# Patient Record
Sex: FEMALE | Race: OTHER | HISPANIC OR LATINO
[De-identification: names, ages, dates, MRNs, and addresses within clinical notes are randomized per-mention and may not be internally consistent; named-entity substitution may affect disease eponyms.]

---

## 2017-03-16 ENCOUNTER — RX RENEWAL (OUTPATIENT)
Age: 47
End: 2017-03-16

## 2017-05-12 ENCOUNTER — FORM ENCOUNTER (OUTPATIENT)
Age: 47
End: 2017-05-12

## 2017-05-13 ENCOUNTER — APPOINTMENT (OUTPATIENT)
Dept: MAMMOGRAPHY | Facility: IMAGING CENTER | Age: 47
End: 2017-05-13

## 2017-05-13 ENCOUNTER — OUTPATIENT (OUTPATIENT)
Dept: OUTPATIENT SERVICES | Facility: HOSPITAL | Age: 47
LOS: 1 days | End: 2017-05-13
Payer: MEDICAID

## 2017-05-13 DIAGNOSIS — Z00.00 ENCOUNTER FOR GENERAL ADULT MEDICAL EXAMINATION WITHOUT ABNORMAL FINDINGS: ICD-10-CM

## 2017-05-13 PROCEDURE — 77067 SCR MAMMO BI INCL CAD: CPT

## 2017-05-13 PROCEDURE — 77063 BREAST TOMOSYNTHESIS BI: CPT

## 2019-01-17 ENCOUNTER — OUTPATIENT (OUTPATIENT)
Dept: OUTPATIENT SERVICES | Facility: HOSPITAL | Age: 49
LOS: 1 days | End: 2019-01-17
Payer: COMMERCIAL

## 2019-01-17 ENCOUNTER — APPOINTMENT (OUTPATIENT)
Dept: INTERNAL MEDICINE | Facility: CLINIC | Age: 49
End: 2019-01-17

## 2019-01-17 VITALS
HEART RATE: 73 BPM | BODY MASS INDEX: 23.99 KG/M2 | SYSTOLIC BLOOD PRESSURE: 112 MMHG | HEIGHT: 59 IN | OXYGEN SATURATION: 98 % | DIASTOLIC BLOOD PRESSURE: 66 MMHG | WEIGHT: 119 LBS

## 2019-01-17 VITALS — TEMPERATURE: 98.2 F

## 2019-01-17 DIAGNOSIS — R05 COUGH: ICD-10-CM

## 2019-01-17 DIAGNOSIS — R73.03 PREDIABETES: ICD-10-CM

## 2019-01-17 DIAGNOSIS — J30.9 ALLERGIC RHINITIS, UNSPECIFIED: ICD-10-CM

## 2019-01-17 DIAGNOSIS — I10 ESSENTIAL (PRIMARY) HYPERTENSION: ICD-10-CM

## 2019-01-17 PROCEDURE — G0463: CPT

## 2019-01-25 NOTE — END OF VISIT
[] : Resident [FreeTextEntry3] : 46F PMH chronic allergies, prediabetes (5.9%), HLD, c/o dry cough for 6 weeks after URI. Likely post-viral cough 2/2 PND. Will treat for this and plan for follow up to ensure resolution. Would consider GERD as well. Patient with no smoking history or weight loss. Would try PPI if treatment for PND not successful before considering imaging.

## 2019-01-25 NOTE — PHYSICAL EXAM
[No Acute Distress] : no acute distress [Well Nourished] : well nourished [Well Developed] : well developed [Well-Appearing] : well-appearing [Normal Sclera/Conjunctiva] : normal sclera/conjunctiva [PERRL] : pupils equal round and reactive to light [EOMI] : extraocular movements intact [Normal Outer Ear/Nose] : the outer ears and nose were normal in appearance [No JVD] : no jugular venous distention [Supple] : supple [No Lymphadenopathy] : no lymphadenopathy [No Respiratory Distress] : no respiratory distress  [Clear to Auscultation] : lungs were clear to auscultation bilaterally [No Accessory Muscle Use] : no accessory muscle use [Normal Rate] : normal rate  [Regular Rhythm] : with a regular rhythm [Normal S1, S2] : normal S1 and S2 [No Murmur] : no murmur heard [Pedal Pulses Present] : the pedal pulses are present [No Edema] : there was no peripheral edema [No Extremity Clubbing/Cyanosis] : no extremity clubbing/cyanosis [Soft] : abdomen soft [Non Tender] : non-tender [Non-distended] : non-distended [No Masses] : no abdominal mass palpated [No HSM] : no HSM [Normal Bowel Sounds] : normal bowel sounds [Normal Supraclavicular Nodes] : no supraclavicular lymphadenopathy [Normal Posterior Cervical Nodes] : no posterior cervical lymphadenopathy [Normal Anterior Cervical Nodes] : no anterior cervical lymphadenopathy [No CVA Tenderness] : no CVA  tenderness [No Spinal Tenderness] : no spinal tenderness [No Joint Swelling] : no joint swelling [Grossly Normal Strength/Tone] : grossly normal strength/tone [No Rash] : no rash [Normal Gait] : normal gait [Coordination Grossly Intact] : coordination grossly intact [No Focal Deficits] : no focal deficits [Deep Tendon Reflexes (DTR)] : deep tendon reflexes were 2+ and symmetric [Normal Affect] : the affect was normal [Normal Insight/Judgement] : insight and judgment were intact [de-identified] : no sinus tenderness; erythmatous turbinates

## 2019-01-25 NOTE — ASSESSMENT
[FreeTextEntry1] : 46F PMH chronic allergies, prediabetes (5.9%), HLD, presenting for CPE.\par \par chronic cough - Post viral cough vs PND vs GERD vs malignancy. No LAD and with clear lung exam. \par - start guaifenesin, fluticasone, loratadine, montelukast\par -f/u in 5 wks, if no imporvement will need CT chst\par - f/u cbc, cmp \par allergies - plan as above\par \par \par HCM \par Routine labs - lipids, a1c, tsh\par Pap - 2014 nml, due 2019\par Mammo referral given today \par flu shot deferred\par \par f/u n 5 wks for chronic cough\par d/w Dr Michelle

## 2019-01-25 NOTE — HISTORY OF PRESENT ILLNESS
[de-identified] : 48 F PMH chronic allergies, prediabetes (5.9%), HLD, presenting for CPE.\par \par cough - Reports cough for 6 wks after a URI. Previously with fever, cough, rhinorrhea, chest congestion. Now reports continued noproductive cough with associated chest congestion. Has tried taking theraflu, zyrtec, and claritin. Last subjective fever apprx 1 wk ago. \par \par allergies - reports chronic allergies with rhinorrhea, red eyes, and post nasal drip. Ran out of previous meds of montelukast and fluticasone.\par \par HCM \par Pap - 2014 nml\par Mammo 2017 birads 1

## 2019-01-25 NOTE — HEALTH RISK ASSESSMENT
[Good] : ~his/her~  mood as  good [No falls in past year] : Patient reported no falls in the past year [0] : 2) Feeling down, depressed, or hopeless: Not at all (0) [Patient reported mammogram was normal] : Patient reported mammogram was normal [Patient reported PAP Smear was normal] : Patient reported PAP Smear was normal [Change in mental status noted] : Change in mental status noted [With Family] : lives with family [Employed] : employed [] :  [Feels Safe at Home] : Feels safe at home [] : No [Reports changes in hearing] : Reports no changes in hearing [Reports changes in vision] : Reports no changes in vision [Reports changes in dental health] : Reports no changes in dental health [MammogramDate] : 2017 [PapSmearDate] : 2014 [FreeTextEntry2] : [employer]

## 2019-02-20 ENCOUNTER — FORM ENCOUNTER (OUTPATIENT)
Age: 49
End: 2019-02-20

## 2019-02-21 ENCOUNTER — APPOINTMENT (OUTPATIENT)
Dept: MAMMOGRAPHY | Facility: IMAGING CENTER | Age: 49
End: 2019-02-21
Payer: COMMERCIAL

## 2019-02-21 ENCOUNTER — OUTPATIENT (OUTPATIENT)
Dept: OUTPATIENT SERVICES | Facility: HOSPITAL | Age: 49
LOS: 1 days | End: 2019-02-21
Payer: COMMERCIAL

## 2019-02-21 DIAGNOSIS — R05 COUGH: ICD-10-CM

## 2019-02-21 PROCEDURE — 77063 BREAST TOMOSYNTHESIS BI: CPT | Mod: 26

## 2019-02-21 PROCEDURE — 77063 BREAST TOMOSYNTHESIS BI: CPT

## 2019-02-21 PROCEDURE — 77067 SCR MAMMO BI INCL CAD: CPT

## 2019-02-21 PROCEDURE — 77067 SCR MAMMO BI INCL CAD: CPT | Mod: 26

## 2019-03-05 ENCOUNTER — APPOINTMENT (OUTPATIENT)
Dept: OPHTHALMOLOGY | Facility: CLINIC | Age: 49
End: 2019-03-05

## 2019-03-05 ENCOUNTER — EMERGENCY (EMERGENCY)
Facility: HOSPITAL | Age: 49
LOS: 1 days | Discharge: HOME | End: 2019-03-05
Attending: EMERGENCY MEDICINE
Payer: COMMERCIAL

## 2019-03-05 VITALS
TEMPERATURE: 98 F | OXYGEN SATURATION: 98 % | SYSTOLIC BLOOD PRESSURE: 115 MMHG | HEART RATE: 77 BPM | RESPIRATION RATE: 18 BRPM | HEIGHT: 62 IN | WEIGHT: 115.96 LBS | DIASTOLIC BLOOD PRESSURE: 70 MMHG

## 2019-03-05 VITALS
TEMPERATURE: 98 F | RESPIRATION RATE: 16 BRPM | DIASTOLIC BLOOD PRESSURE: 74 MMHG | OXYGEN SATURATION: 100 % | SYSTOLIC BLOOD PRESSURE: 96 MMHG | HEART RATE: 64 BPM

## 2019-03-05 LAB
ANION GAP SERPL CALC-SCNC: 12 MMOL/L — SIGNIFICANT CHANGE UP (ref 5–17)
BASOPHILS # BLD AUTO: 0 K/UL — SIGNIFICANT CHANGE UP (ref 0–0.2)
BASOPHILS NFR BLD AUTO: 0.4 % — SIGNIFICANT CHANGE UP (ref 0–2)
BUN SERPL-MCNC: 13 MG/DL — SIGNIFICANT CHANGE UP (ref 7–23)
CALCIUM SERPL-MCNC: 9.6 MG/DL — SIGNIFICANT CHANGE UP (ref 8.4–10.5)
CHLORIDE SERPL-SCNC: 104 MMOL/L — SIGNIFICANT CHANGE UP (ref 96–108)
CO2 SERPL-SCNC: 25 MMOL/L — SIGNIFICANT CHANGE UP (ref 22–31)
CREAT SERPL-MCNC: 0.67 MG/DL — SIGNIFICANT CHANGE UP (ref 0.5–1.3)
EOSINOPHIL # BLD AUTO: 0.2 K/UL — SIGNIFICANT CHANGE UP (ref 0–0.5)
EOSINOPHIL NFR BLD AUTO: 3 % — SIGNIFICANT CHANGE UP (ref 0–6)
GLUCOSE SERPL-MCNC: 85 MG/DL — SIGNIFICANT CHANGE UP (ref 70–99)
HCT VFR BLD CALC: 36.6 % — SIGNIFICANT CHANGE UP (ref 34.5–45)
HGB BLD-MCNC: 13 G/DL — SIGNIFICANT CHANGE UP (ref 11.5–15.5)
LYMPHOCYTES # BLD AUTO: 2.9 K/UL — SIGNIFICANT CHANGE UP (ref 1–3.3)
LYMPHOCYTES # BLD AUTO: 49.5 % — HIGH (ref 13–44)
MCHC RBC-ENTMCNC: 30.4 PG — SIGNIFICANT CHANGE UP (ref 27–34)
MCHC RBC-ENTMCNC: 35.6 GM/DL — SIGNIFICANT CHANGE UP (ref 32–36)
MCV RBC AUTO: 85.4 FL — SIGNIFICANT CHANGE UP (ref 80–100)
MONOCYTES # BLD AUTO: 0.4 K/UL — SIGNIFICANT CHANGE UP (ref 0–0.9)
MONOCYTES NFR BLD AUTO: 6.7 % — SIGNIFICANT CHANGE UP (ref 2–14)
NEUTROPHILS # BLD AUTO: 2.4 K/UL — SIGNIFICANT CHANGE UP (ref 1.8–7.4)
NEUTROPHILS NFR BLD AUTO: 40.4 % — LOW (ref 43–77)
PLATELET # BLD AUTO: 177 K/UL — SIGNIFICANT CHANGE UP (ref 150–400)
POTASSIUM SERPL-MCNC: 3.9 MMOL/L — SIGNIFICANT CHANGE UP (ref 3.5–5.3)
POTASSIUM SERPL-SCNC: 3.9 MMOL/L — SIGNIFICANT CHANGE UP (ref 3.5–5.3)
RBC # BLD: 4.28 M/UL — SIGNIFICANT CHANGE UP (ref 3.8–5.2)
RBC # FLD: 11.9 % — SIGNIFICANT CHANGE UP (ref 10.3–14.5)
SODIUM SERPL-SCNC: 141 MMOL/L — SIGNIFICANT CHANGE UP (ref 135–145)
WBC # BLD: 5.9 K/UL — SIGNIFICANT CHANGE UP (ref 3.8–10.5)
WBC # FLD AUTO: 5.9 K/UL — SIGNIFICANT CHANGE UP (ref 3.8–10.5)

## 2019-03-05 PROCEDURE — 70487 CT MAXILLOFACIAL W/DYE: CPT

## 2019-03-05 PROCEDURE — 85027 COMPLETE CBC AUTOMATED: CPT

## 2019-03-05 PROCEDURE — 99284 EMERGENCY DEPT VISIT MOD MDM: CPT

## 2019-03-05 PROCEDURE — 80048 BASIC METABOLIC PNL TOTAL CA: CPT

## 2019-03-05 PROCEDURE — 70487 CT MAXILLOFACIAL W/DYE: CPT | Mod: 26

## 2019-03-05 NOTE — ED PROVIDER NOTE - ATTENDING CONTRIBUTION TO CARE
Attending MD Cohen: I personally have seen and examined this patient.  Resident note reviewed and agree on plan of care and except where noted.  See below for details.     Seen in FT9    49F with no reported PMH/PSH/Meds/NKDA presents to the ED with R eye pain.  Reports that on Thursday 2/28/19 developed stabbing like pain of the right eye, reports since then has developed swelling.  Reports has had two previous episodes, last episode was two months ago.  Reports last seen by ophthalmologist two years ago, reports wears glasses to read but did not bring them.  Denies fever, denies trauma.  Reports pain with eye movement.  Reports blurry vision.  Reports cramping pain in the L eye as well.  Reports has seen discharge. Attending MD Cohen: I personally have seen and examined this patient.  Resident note reviewed and agree on plan of care and except where noted.  See below for details.     Seen in FT9    49F with no reported PMH/PSH/Meds/NKDA presents to the ED with R eye pain.  Reports that on Thursday 2/28/19 developed stabbing like pain of the right eye, reports since then has developed swelling.  Reports has had two previous episodes, last episode was two months ago.  Reports last seen by ophthalmologist two years ago, reports wears glasses but did not bring them.  Denies contact lens use.  Denies fever, denies trauma.  Reports pain with eye movement.  Reports blurry vision.  Reports cramping pain in the L eye as well.  Reports has seen discharge.  On exam, NAD, head NCAT, PERRL, EOMI, confrontational VF full, Va sc OD 20/70+1, OS 20/70+2, OU 20/70+2, no periorbital ecchymosis, no tenderness to palpation of orbital rim, lid margins clear, +R upper lid edema, no retained foreign body on lid eversion, no bulbar conjunctival injection, +1 tarsal conjunctival injection OD, no corneal defect, AC no cells or flare, fundus exam limited, disc margins sharp and flat, FROM at neck, no tenderness to midline palpation, no stepoffs along length of spine, lungs CTAB with good inspiratory effort, +S1S2, no m/r/g, moving all extremities, ambulating with steady gait; A/P: 49F with R eye pain, Ddx low suspicion for orbital cellulitis but given patient multiple complaints of pain with eye movement, discharge, and blurry vision will obtain CT to rule out, other DDx includes R upper lid edema, ?hordeolum/chalazion in infancy, contact dermatitis, will reassess

## 2019-03-05 NOTE — ED PROVIDER NOTE - OBJECTIVE STATEMENT
Marlene Pérez M.D: 49F no pmh p/w 2 months of intermittent right eye cramping pain and swelling. also c/o pain with EOM, blurry vision, and minimal purulent discharge from eyes. no fevers/chills. has not seen a doctor for this yet. normally wears glasses but does not have them with her today. has not taken anything for pain

## 2019-03-05 NOTE — ED ADULT NURSE NOTE - OBJECTIVE STATEMENT
49 y.o. Female presents to the ED accompanied by  for R eye swelling and pain. Hx . Citizen of [place] speaking with Dr. Cohen at bedside to translate. As per pt, she felt a stabbing pain on R eye on Thursday and developed swelling. Pt reports having an episode 2 months ago. Pt wears glasses. Denies trauma, fever. +blurry vision and pain of R eye - cramping. No redness noted on eye. A&Ox3. Ambulatory. Well-appearing. Pt is in no current distress. Comfort and safety provided. Will continue to monitor. Dr. Cohen at bedside for assessment.

## 2019-03-05 NOTE — ED ADULT NURSE NOTE - CHPI ED NUR SYMPTOMS NEG
no bleeding gums/no chills/no fever/no nausea/no numbness/no loss of consciousness/no syncope/no vomiting/no weakness

## 2019-03-05 NOTE — ED PROVIDER NOTE - NSFOLLOWUPCLINICS_GEN_ALL_ED_FT
[facility] - Ophthalmology  Ophthalmology  [address], [address]  [place] [address]  Phone: [phone]  Fax:   Follow Up Time: 1-3 Days

## 2019-03-05 NOTE — ED PROVIDER NOTE - NSFOLLOWUPINSTRUCTIONS_ED_ALL_ED_FT
- You were seen in the Emergency Department today.  You had a CT scan which showed your had preseptal cellulitis.     - Your case discussed was discussed by the ED Attending Dr. Cohen with the Ophthalmology Resident Dr. Gomes.  You are to go to the Ophthalmology Clinic today as they are expecting you today.  Dr. Gomes was to call to make the clinic aware of your impending arrival.  - Once you are evaluated at the clinic, please verify that they want you to take the prescribed AUGMENTIN or if they would prefer a different medication.  AUGMENTIN has already been sent to the pharmacy.    - Return to the ED for new or worsening symptoms.

## 2019-03-05 NOTE — ED PROVIDER NOTE - PHYSICAL EXAMINATION
Marlene Pérez M.D.:   patient awake alert seen lying on stretcher NAD .   LUNGS CTAB no wheeze no crackle.   CARD RRR no m/r/g.    Abdomen soft NT ND no rebound no guarding no CVA tenderness.   EXT WWP no edema no calf tenderness CV 2+DP/PT bilaterally.   neuro A&Ox3 gait normal.    skin warm and dry no rash  HEENT: moist mucous membranes, PERRL, EOMI no conjunctival injection or drainage to speak of. no apparent distress with EOMI (despite HPI). mild swelling to upper lid without overlying cellulitic changes.  full ophtho exam performed and documented in attending attestation.

## 2019-03-05 NOTE — ED PROVIDER NOTE - PROGRESS NOTE DETAILS
Attending MD Cohen: Spoke with Dr. Gomes of ophthalmology, will Rx Augmentin and send to Ophtho clinic from here.  Stable for discharge. Follow up instructions given, importance of follow up emphasized, return to ED parameters reviewed and patient verbalized understanding.  All questions answered, all concerns addressed.

## 2019-03-08 ENCOUNTER — APPOINTMENT (OUTPATIENT)
Dept: OPHTHALMOLOGY | Facility: CLINIC | Age: 49
End: 2019-03-08

## 2019-03-14 ENCOUNTER — OUTPATIENT (OUTPATIENT)
Dept: OUTPATIENT SERVICES | Facility: HOSPITAL | Age: 49
LOS: 1 days | End: 2019-03-14
Payer: COMMERCIAL

## 2019-03-14 ENCOUNTER — APPOINTMENT (OUTPATIENT)
Dept: INTERNAL MEDICINE | Facility: CLINIC | Age: 49
End: 2019-03-14
Payer: COMMERCIAL

## 2019-03-14 VITALS
DIASTOLIC BLOOD PRESSURE: 70 MMHG | HEIGHT: 59 IN | SYSTOLIC BLOOD PRESSURE: 110 MMHG | WEIGHT: 114 LBS | BODY MASS INDEX: 22.98 KG/M2

## 2019-03-14 DIAGNOSIS — I10 ESSENTIAL (PRIMARY) HYPERTENSION: ICD-10-CM

## 2019-03-14 DIAGNOSIS — L03.213 PERIORBITAL CELLULITIS: ICD-10-CM

## 2019-03-14 PROCEDURE — G0463: CPT

## 2019-03-14 PROCEDURE — 99213 OFFICE O/P EST LOW 20 MIN: CPT | Mod: GE

## 2019-03-20 NOTE — HISTORY OF PRESENT ILLNESS
[Spouse] : spouse [FreeTextEntry1] : follow up after ED visit [de-identified] : 49F w/ PMH prediabetes and chronic allergies presents after ED visit for right eye pain found to have preseptal cellulitis. Patient developed right eye redness, eyelid swelling, and eye pain about 2 weeks ago. Patient went to [facility] ED on 3/5 and was evaluated with CT, which showed right preseptal cellulitis. She was prescribed Augmentin and sent directly to ophtho clinic from the ED. Patient was evaluated at ophtho clinic that day and seen for f/u one week ago and cleared with no further intervention necessary.\par \par Today, patient states eye pain has resolved. She has been taking Augmentin daily and has 3 more tablets to complete her course. She denies any redness, pain on eye movement, eye discharge, vision changes. She denies fevers, chills, headache.\par \par Patient today c/o insomnia for the past 20+ years. She states she gets 1-2 hours of sleep maximum each night. She has been going to bed at 9pm and watches TV until 11pm. Only caffeine intake is 1 cup coffee in the AM. She has tried melatonin, Nyquil, and various other OTC medications for sleep (names of which she does not recall), and none have been effective. Patient states that she has always been the type to think a lot and worry since a teenager but does not think that stress or racing thoughts are keeping her awake at night. Patient denies depressed mood, anhedonia, SI. Patient was offered referral to behavioral health but she refused, stating that she is very Scientologist and prefers to pray to God. Patient and  deny pt snoring or waking up SOB.\par \par \par

## 2019-03-20 NOTE — PHYSICAL EXAM
[No Acute Distress] : no acute distress [Well Nourished] : well nourished [Well-Appearing] : well-appearing [Normal Sclera/Conjunctiva] : normal sclera/conjunctiva [PERRL] : pupils equal round and reactive to light [EOMI] : extraocular movements intact [Normal Outer Ear/Nose] : the outer ears and nose were normal in appearance [Normal Oropharynx] : the oropharynx was normal [Supple] : supple [No Lymphadenopathy] : no lymphadenopathy [No Respiratory Distress] : no respiratory distress  [Clear to Auscultation] : lungs were clear to auscultation bilaterally [No Accessory Muscle Use] : no accessory muscle use [Normal Rate] : normal rate  [Regular Rhythm] : with a regular rhythm [Normal S1, S2] : normal S1 and S2 [Soft] : abdomen soft [Non Tender] : non-tender [Non-distended] : non-distended [Normal Bowel Sounds] : normal bowel sounds [Normal Gait] : normal gait [Normal Affect] : the affect was normal [Normal Mood] : the mood was normal [de-identified] : +R maxillary tenderness

## 2019-03-20 NOTE — ASSESSMENT
[FreeTextEntry1] : 49F w/ PMH prediabetes and chronic allergies presents after ED visit for right eye pain found to have preseptal cellulitis.\par \par #preseptal cellulitis\par -Patient w/ R preseptal cellulitis currently taking Augmentin. Symptoms of pain and swelling much improved. Patient evaluated at ophtho clinic 2x since onset of sx, and pt states no f/u required.\par -Patient advised to finish treatment of Augmentin and to call if symptoms worsen.\par \par #insomnia\par -Patient with chronic insomnia, possibly due to poor sleep hygiene vs. general anxiety.\par -Patient advised on sleep hygiene: no screen use 2 hours prior to bed. No reading or watching screens in bed. Make room cool, dark, and quiet when going to bed, with use of eye mask if necessary.\par -Patient declined referral to Behavioral Health for possible iCBT. Patient states she will think about it.\par -Patient given referral for home sleep study.\par \par #HCM\par -results of lab results from January 2019 reviewed with patient.\par -patient advised to reduce consumption of white rice, noodles, and bread as well as to increase exercise.\par -Return to [facility] in January 2020 for CPE or call if any acute concerns. \par \par \par Case discussed with Dr. Michelle.\par \par

## 2019-03-20 NOTE — REVIEW OF SYSTEMS
[Insomnia] : insomnia [Fever] : no fever [Chills] : no chills [Discharge] : no discharge [Pain] : no pain [Redness] : no redness [Vision Problems] : no vision problems [Earache] : no earache [Chest Pain] : no chest pain [Paroysmal Nocturnal Dyspnea] : no paroysmal nocturnal dyspnea [Shortness Of Breath] : no shortness of breath [Cough] : no cough [Abdominal Pain] : no abdominal pain [Headache] : no headache [Vomiting] : no vomiting [Diarrhea] : diarrhea [Depression] : no depression [Suicidal] : not suicidal

## 2019-03-21 DIAGNOSIS — L03.213 PERIORBITAL CELLULITIS: ICD-10-CM

## 2019-03-21 DIAGNOSIS — G47.00 INSOMNIA, UNSPECIFIED: ICD-10-CM

## 2019-06-14 ENCOUNTER — MESSAGE (OUTPATIENT)
Age: 49
End: 2019-06-14

## 2020-02-06 ENCOUNTER — LABORATORY RESULT (OUTPATIENT)
Age: 50
End: 2020-02-06

## 2020-02-06 ENCOUNTER — APPOINTMENT (OUTPATIENT)
Dept: INTERNAL MEDICINE | Facility: CLINIC | Age: 50
End: 2020-02-06
Payer: MEDICAID

## 2020-02-06 ENCOUNTER — OUTPATIENT (OUTPATIENT)
Dept: OUTPATIENT SERVICES | Facility: HOSPITAL | Age: 50
LOS: 1 days | End: 2020-02-06
Payer: MEDICAID

## 2020-02-06 VITALS
BODY MASS INDEX: 20.8 KG/M2 | SYSTOLIC BLOOD PRESSURE: 100 MMHG | HEART RATE: 73 BPM | OXYGEN SATURATION: 99 % | WEIGHT: 103 LBS | DIASTOLIC BLOOD PRESSURE: 60 MMHG

## 2020-02-06 DIAGNOSIS — Z12.4 ENCOUNTER FOR SCREENING FOR MALIGNANT NEOPLASM OF CERVIX: ICD-10-CM

## 2020-02-06 DIAGNOSIS — I10 ESSENTIAL (PRIMARY) HYPERTENSION: ICD-10-CM

## 2020-02-06 DIAGNOSIS — R73.03 PREDIABETES.: ICD-10-CM

## 2020-02-06 LAB
HCT VFR BLD CALC: 36.9 % — SIGNIFICANT CHANGE UP (ref 34.5–45)
HGB BLD-MCNC: 11.9 G/DL — SIGNIFICANT CHANGE UP (ref 11.5–15.5)
MCHC RBC-ENTMCNC: 28.4 PG — SIGNIFICANT CHANGE UP (ref 27–34)
MCHC RBC-ENTMCNC: 32.2 GM/DL — SIGNIFICANT CHANGE UP (ref 32–36)
MCV RBC AUTO: 88.1 FL — SIGNIFICANT CHANGE UP (ref 80–100)
PLATELET # BLD AUTO: 230 K/UL — SIGNIFICANT CHANGE UP (ref 150–400)
RBC # BLD: 4.19 M/UL — SIGNIFICANT CHANGE UP (ref 3.8–5.2)
RBC # FLD: 13.1 % — SIGNIFICANT CHANGE UP (ref 10.3–14.5)
WBC # BLD: 7.73 K/UL — SIGNIFICANT CHANGE UP (ref 3.8–10.5)
WBC # FLD AUTO: 7.73 K/UL — SIGNIFICANT CHANGE UP (ref 3.8–10.5)

## 2020-02-06 PROCEDURE — 99214 OFFICE O/P EST MOD 30 MIN: CPT | Mod: GC

## 2020-02-06 PROCEDURE — 80053 COMPREHEN METABOLIC PANEL: CPT

## 2020-02-06 PROCEDURE — 80061 LIPID PANEL: CPT

## 2020-02-06 PROCEDURE — 85027 COMPLETE CBC AUTOMATED: CPT

## 2020-02-06 PROCEDURE — 83036 HEMOGLOBIN GLYCOSYLATED A1C: CPT

## 2020-02-06 PROCEDURE — G0463: CPT

## 2020-02-06 RX ORDER — GUAIFENESIN 400 MG/1
400 TABLET ORAL EVERY 4 HOURS
Qty: 42 | Refills: 0 | Status: DISCONTINUED | COMMUNITY
Start: 2019-01-17 | End: 2020-02-06

## 2020-02-06 RX ORDER — SODIUM BICARBONATE, SODIUM CHLORIDE 700; 2300 MG/3G; MG/3G
2300-700 POWDER, FOR SOLUTION NASAL
Qty: 1 | Refills: 0 | Status: DISCONTINUED | COMMUNITY
Start: 2019-01-17 | End: 2020-02-06

## 2020-02-06 NOTE — PHYSICAL EXAM
[No Acute Distress] : no acute distress [Well Nourished] : well nourished [Well Developed] : well developed [Normal Sclera/Conjunctiva] : normal sclera/conjunctiva [Well-Appearing] : well-appearing [Normal Outer Ear/Nose] : the outer ears and nose were normal in appearance [EOMI] : extraocular movements intact [PERRL] : pupils equal round and reactive to light [No JVD] : no jugular venous distention [Normal Oropharynx] : the oropharynx was normal [No Lymphadenopathy] : no lymphadenopathy [Supple] : supple [No Respiratory Distress] : no respiratory distress  [No Accessory Muscle Use] : no accessory muscle use [Clear to Auscultation] : lungs were clear to auscultation bilaterally [Normal Rate] : normal rate  [Regular Rhythm] : with a regular rhythm [Normal S1, S2] : normal S1 and S2 [No Murmur] : no murmur heard [No Varicosities] : no varicosities [Pedal Pulses Present] : the pedal pulses are present [No Edema] : there was no peripheral edema [No Extremity Clubbing/Cyanosis] : no extremity clubbing/cyanosis [Soft] : abdomen soft [Non Tender] : non-tender [Non-distended] : non-distended [No Masses] : no abdominal mass palpated [No HSM] : no HSM [Normal Posterior Cervical Nodes] : no posterior cervical lymphadenopathy [Normal Bowel Sounds] : normal bowel sounds [Normal Supraclavicular Nodes] : no supraclavicular lymphadenopathy [Normal Anterior Cervical Nodes] : no anterior cervical lymphadenopathy [Grossly Normal Strength/Tone] : grossly normal strength/tone [No Joint Swelling] : no joint swelling [No Rash] : no rash [Coordination Grossly Intact] : coordination grossly intact [No Focal Deficits] : no focal deficits [Normal Affect] : the affect was normal [Normal Insight/Judgement] : insight and judgment were intact

## 2020-02-07 ENCOUNTER — RX RENEWAL (OUTPATIENT)
Age: 50
End: 2020-02-07

## 2020-02-07 LAB
ALBUMIN SERPL ELPH-MCNC: 4.3 G/DL — SIGNIFICANT CHANGE UP (ref 3.3–5)
ALP SERPL-CCNC: 87 U/L — SIGNIFICANT CHANGE UP (ref 40–120)
ALT FLD-CCNC: 16 U/L — SIGNIFICANT CHANGE UP (ref 10–45)
ANION GAP SERPL CALC-SCNC: 12 MMOL/L — SIGNIFICANT CHANGE UP (ref 5–17)
AST SERPL-CCNC: 16 U/L — SIGNIFICANT CHANGE UP (ref 10–40)
BILIRUB SERPL-MCNC: <0.2 MG/DL — SIGNIFICANT CHANGE UP (ref 0.2–1.2)
BUN SERPL-MCNC: 18 MG/DL — SIGNIFICANT CHANGE UP (ref 7–23)
CALCIUM SERPL-MCNC: 9.2 MG/DL — SIGNIFICANT CHANGE UP (ref 8.4–10.5)
CHLORIDE SERPL-SCNC: 104 MMOL/L — SIGNIFICANT CHANGE UP (ref 96–108)
CHOLEST SERPL-MCNC: 184 MG/DL — SIGNIFICANT CHANGE UP (ref 10–199)
CO2 SERPL-SCNC: 25 MMOL/L — SIGNIFICANT CHANGE UP (ref 22–31)
CREAT SERPL-MCNC: 0.79 MG/DL — SIGNIFICANT CHANGE UP (ref 0.5–1.3)
ESTIMATED AVERAGE GLUCOSE: 117 MG/DL — HIGH (ref 68–114)
GLUCOSE SERPL-MCNC: 94 MG/DL — SIGNIFICANT CHANGE UP (ref 70–99)
HBA1C BLD-MCNC: 5.7 % — HIGH (ref 4–5.6)
HDLC SERPL-MCNC: 60 MG/DL — SIGNIFICANT CHANGE UP
LIPID PNL WITH DIRECT LDL SERPL: 108 MG/DL — HIGH
POTASSIUM SERPL-MCNC: 4 MMOL/L — SIGNIFICANT CHANGE UP (ref 3.5–5.3)
POTASSIUM SERPL-SCNC: 4 MMOL/L — SIGNIFICANT CHANGE UP (ref 3.5–5.3)
PROT SERPL-MCNC: 6.4 G/DL — SIGNIFICANT CHANGE UP (ref 6–8.3)
SODIUM SERPL-SCNC: 141 MMOL/L — SIGNIFICANT CHANGE UP (ref 135–145)
TOTAL CHOLESTEROL/HDL RATIO MEASUREMENT: 3.1 RATIO — LOW (ref 3.3–7.1)
TRIGL SERPL-MCNC: 79 MG/DL — SIGNIFICANT CHANGE UP (ref 10–149)

## 2020-02-20 DIAGNOSIS — Z12.4 ENCOUNTER FOR SCREENING FOR MALIGNANT NEOPLASM OF CERVIX: ICD-10-CM

## 2020-02-20 DIAGNOSIS — K21.9 GASTRO-ESOPHAGEAL REFLUX DISEASE WITHOUT ESOPHAGITIS: ICD-10-CM

## 2020-02-20 DIAGNOSIS — G47.00 INSOMNIA, UNSPECIFIED: ICD-10-CM

## 2020-02-20 DIAGNOSIS — R73.03 PREDIABETES: ICD-10-CM

## 2020-02-20 NOTE — REVIEW OF SYSTEMS
[Heartburn] : heartburn [Fever] : no fever [Chills] : no chills [Night Sweats] : no night sweats [Shortness Of Breath] : no shortness of breath [Palpitations] : no palpitations [Chest Pain] : no chest pain [Orthopnea] : no orthopnea [Cough] : no cough [Wheezing] : no wheezing [Nausea] : no nausea [Dyspnea on Exertion] : no dyspnea on exertion [Abdominal Pain] : no abdominal pain [Diarrhea] : diarrhea [Vomiting] : no vomiting [Melena] : no melena [Dysuria] : no dysuria [Hematuria] : no hematuria

## 2020-02-20 NOTE — HISTORY OF PRESENT ILLNESS
[FreeTextEntry1] : reflux  [de-identified] : 49F w/ PMH prediabetes and chronic allergies presents due to acid reflux\par \par reflux - endorses feeling acid reflux from stomach upto the throat and now with feeling like something is stuck in throat, feels it occur it everyday. Has no trouble swallowing or drinking. has tried OTC meds without improvement, tums and peptobismal. Endorses feeling weight loss since last year. Denies n/v/diarrhea. Not associated with any particular foods. Has attempted to cutdown on acidic foods without improvement.\par \par insomnia - has hx of insomnia but now feels she is sleeping less due to health concerns. Wakes up multiple times at night. Awakens with all noises and does not feel rested in AM. denies depression and anxiety.Denies screen use prior to bed. Goes to bed everynight at same time. PHQ 2 score of 0.\par \par HCM\par wants Gyn referral

## 2020-02-20 NOTE — ASSESSMENT
[FreeTextEntry1] : 49F w/ PMH prediabetes and chronic allergies presents due to acid reflux\par \par Throat sensation 2/2 GERD - start omeprazole 40mg qd. ddx includes Post Nasal Drip and anxiety\par - GI referral due to weight loss\par - cont allergy medications\par - consider SSRI for anxiety if no improvement and if gastritis is ruled out by GI\par \par insomnia - \par - sleep hygiene information given\par - sleep specialist referral given\par - can try zaleplon at next visit if no improvement\par \par prediabetes- \par - f/u cbc/cmp/tsh, A1c, lipids\par \par HCM \par -gyn referral given for cervical ca screening\par \par f/u in 2 months \par d/w Dr Bear

## 2020-03-23 ENCOUNTER — APPOINTMENT (OUTPATIENT)
Dept: OBGYN | Facility: CLINIC | Age: 50
End: 2020-03-23

## 2020-04-16 ENCOUNTER — APPOINTMENT (OUTPATIENT)
Dept: INTERNAL MEDICINE | Facility: CLINIC | Age: 50
End: 2020-04-16

## 2020-08-04 ENCOUNTER — APPOINTMENT (OUTPATIENT)
Dept: GASTROENTEROLOGY | Facility: HOSPITAL | Age: 50
End: 2020-08-04

## 2021-01-19 ENCOUNTER — OUTPATIENT (OUTPATIENT)
Dept: OUTPATIENT SERVICES | Facility: HOSPITAL | Age: 51
LOS: 1 days | End: 2021-01-19
Payer: MEDICAID

## 2021-01-19 ENCOUNTER — MED ADMIN CHARGE (OUTPATIENT)
Age: 51
End: 2021-01-19

## 2021-01-19 ENCOUNTER — LABORATORY RESULT (OUTPATIENT)
Age: 51
End: 2021-01-19

## 2021-01-19 ENCOUNTER — APPOINTMENT (OUTPATIENT)
Dept: INTERNAL MEDICINE | Facility: CLINIC | Age: 51
End: 2021-01-19
Payer: MEDICAID

## 2021-01-19 VITALS
HEART RATE: 82 BPM | DIASTOLIC BLOOD PRESSURE: 70 MMHG | WEIGHT: 105 LBS | SYSTOLIC BLOOD PRESSURE: 110 MMHG | OXYGEN SATURATION: 99 % | BODY MASS INDEX: 21.21 KG/M2

## 2021-01-19 DIAGNOSIS — I10 ESSENTIAL (PRIMARY) HYPERTENSION: ICD-10-CM

## 2021-01-19 DIAGNOSIS — Z23 ENCOUNTER FOR IMMUNIZATION: ICD-10-CM

## 2021-01-19 PROCEDURE — G0463: CPT

## 2021-01-19 PROCEDURE — 99396 PREV VISIT EST AGE 40-64: CPT | Mod: GC

## 2021-01-19 NOTE — PLAN
[FreeTextEntry1] : #GERD:\par - pt amenable to getting tested for H. pylori. 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 Salinas\par Rosas Cabrera, PGY-1

## 2021-01-19 NOTE — HISTORY OF PRESENT ILLNESS
[FreeTextEntry1] : cpe [de-identified] : 50y F PMHx GERD, chronic allergies p/w cpe. Accompanied by her daughter. Pt does not have any acute medical complaints but notes that she has allergies during all 4 seasons. She works as a  and is constantly exposed to dust and other particles in the home. She also notes her hands get very dry with the cleaning products. She takes omeprazole, loratadine, fluticasone, and montelukast and these help her symptoms. She has never been tested for H. Pylori. \par \par Of note, pt notes she went through menopause at 47. Pt also notes she got her flu shot in November.

## 2021-01-21 DIAGNOSIS — Z00.00 ENCOUNTER FOR GENERAL ADULT MEDICAL EXAMINATION WITHOUT ABNORMAL FINDINGS: ICD-10-CM

## 2021-01-22 ENCOUNTER — NON-APPOINTMENT (OUTPATIENT)
Age: 51
End: 2021-01-22

## 2021-01-22 LAB
BASOPHILS # BLD AUTO: 0.04 K/UL
BASOPHILS NFR BLD AUTO: 0.7 %
EOSINOPHIL # BLD AUTO: 0.21 K/UL
EOSINOPHIL NFR BLD AUTO: 3.7 %
ESTIMATED AVERAGE GLUCOSE: 114 MG/DL
HBA1C MFR BLD HPLC: 5.6 %
HCT VFR BLD CALC: 40 %
HCV AB SER QL: NONREACTIVE
HCV S/CO RATIO: 0.27 S/CO
HGB BLD-MCNC: 12.5 G/DL
IMM GRANULOCYTES NFR BLD AUTO: 0.2 %
LYMPHOCYTES # BLD AUTO: 2.72 K/UL
LYMPHOCYTES NFR BLD AUTO: 47.5 %
MAN DIFF?: NORMAL
MCHC RBC-ENTMCNC: 28.5 PG
MCHC RBC-ENTMCNC: 31.3 GM/DL
MCV RBC AUTO: 91.1 FL
MONOCYTES # BLD AUTO: 0.46 K/UL
MONOCYTES NFR BLD AUTO: 8 %
NEUTROPHILS # BLD AUTO: 2.29 K/UL
NEUTROPHILS NFR BLD AUTO: 39.9 %
PLATELET # BLD AUTO: 221 K/UL
RBC # BLD: 4.39 M/UL
RBC # FLD: 13.2 %
WBC # FLD AUTO: 5.73 K/UL

## 2021-02-03 ENCOUNTER — OUTPATIENT (OUTPATIENT)
Dept: OUTPATIENT SERVICES | Facility: HOSPITAL | Age: 51
LOS: 1 days | End: 2021-02-03
Payer: MEDICAID

## 2021-02-03 ENCOUNTER — LABORATORY RESULT (OUTPATIENT)
Age: 51
End: 2021-02-03

## 2021-02-03 ENCOUNTER — APPOINTMENT (OUTPATIENT)
Dept: OBGYN | Facility: CLINIC | Age: 51
End: 2021-02-03
Payer: MEDICAID

## 2021-02-03 VITALS — TEMPERATURE: 97.5 F

## 2021-02-03 VITALS — BODY MASS INDEX: 22.02 KG/M2 | SYSTOLIC BLOOD PRESSURE: 102 MMHG | DIASTOLIC BLOOD PRESSURE: 60 MMHG | WEIGHT: 109 LBS

## 2021-02-03 DIAGNOSIS — N76.0 ACUTE VAGINITIS: ICD-10-CM

## 2021-02-03 DIAGNOSIS — Z01.419 ENCOUNTER FOR GYNECOLOGICAL EXAMINATION (GENERAL) (ROUTINE) WITHOUT ABNORMAL FINDINGS: ICD-10-CM

## 2021-02-03 DIAGNOSIS — Z01.419 ENCOUNTER FOR GYNECOLOGICAL EXAMINATION (GENERAL) (ROUTINE) W/OUT ABNORMAL FINDINGS: ICD-10-CM

## 2021-02-03 PROCEDURE — 87624 HPV HI-RISK TYP POOLED RSLT: CPT

## 2021-02-03 PROCEDURE — 87491 CHLMYD TRACH DNA AMP PROBE: CPT

## 2021-02-03 PROCEDURE — 87591 N.GONORRHOEAE DNA AMP PROB: CPT

## 2021-02-03 PROCEDURE — 88175 CYTOPATH C/V AUTO FLUID REDO: CPT

## 2021-02-03 NOTE — PHYSICAL EXAM
[Appropriately responsive] : appropriately responsive [Alert] : alert [No Acute Distress] : no acute distress [No Lymphadenopathy] : no lymphadenopathy [Regular Rate Rhythm] : regular rate rhythm [No Murmurs] : no murmurs [Clear to Auscultation B/L] : clear to auscultation bilaterally [Soft] : soft [Non-tender] : non-tender [Non-distended] : non-distended [No HSM] : No HSM [No Lesions] : no lesions [No Mass] : no mass [Oriented x3] : oriented x3 [Examination Of The Breasts] : a normal appearance [No Masses] : no breast masses were palpable [Labia Majora] : normal [Labia Minora] : normal [Atrophy] : atrophy [Normal] : normal [Normal Position] : in a normal position [Tenderness] : nontender [Mass ___ cm] : no uterine mass was palpated [Uterine Adnexae] : normal

## 2021-02-03 NOTE — HISTORY OF PRESENT ILLNESS
[FreeTextEntry1] : 49yo  (LMP 46yo ) presents to reestablish care-  pt has not been seen since 2014.  Denies PMB or pain.  Denies vasomotor sx.  / monogamous-  denies vaginal dryness.  Declined STD screen\par Denies change in bowl / bladder habits or breast complaint. \par \par - PAP 2014 NEG\par - Mammo 2019 Birads1   - [ ]has appt next month\par - Colonoscopy- never-  [ ] has GI appt 2021\par \par Gen health followed by medicine (gerd/allergies)\par  [Currently Active] : currently active [Men] : men [Vaginal] : vaginal [No] : No

## 2021-02-03 NOTE — DISCUSSION/SUMMARY
[FreeTextEntry1] : 49yo P2 - post menopausal annual gyn exam - no complaints\par - [ ]pap/ hpv collected\par - has mammo appt next month\par - has Gi referral, appt 6/2021\par - gen health followed by medicine\par - discussed need for all PMB to be evaluated if to occur.   \par - ca/ vit d supplementation\par \par RTC annual/prn\par Funmilayo Alonso, PAC

## 2021-02-04 LAB
C TRACH RRNA SPEC QL NAA+PROBE: SIGNIFICANT CHANGE UP
C TRACH+GC RRNA SPEC QL PROBE: SIGNIFICANT CHANGE UP
HPV HIGH+LOW RISK DNA PNL CVX: SIGNIFICANT CHANGE UP
N GONORRHOEA RRNA SPEC QL NAA+PROBE: SIGNIFICANT CHANGE UP

## 2021-02-06 LAB — CYTOLOGY SPEC DOC CYTO: SIGNIFICANT CHANGE UP

## 2021-02-12 ENCOUNTER — LABORATORY RESULT (OUTPATIENT)
Age: 51
End: 2021-02-12

## 2021-02-18 RX ORDER — PANTOPRAZOLE 40 MG/1
40 TABLET, DELAYED RELEASE ORAL TWICE DAILY
Qty: 28 | Refills: 0 | Status: COMPLETED | COMMUNITY
Start: 2021-02-18 | End: 2021-03-04

## 2021-02-18 RX ORDER — AMOXICILLIN 500 MG/1
500 TABLET, FILM COATED ORAL
Qty: 56 | Refills: 0 | Status: COMPLETED | COMMUNITY
Start: 2021-02-18 | End: 2021-03-04

## 2021-02-18 RX ORDER — CLARITHROMYCIN 500 MG/1
500 TABLET, FILM COATED ORAL
Qty: 28 | Refills: 0 | Status: COMPLETED | COMMUNITY
Start: 2021-02-18 | End: 2021-03-04

## 2021-03-03 ENCOUNTER — APPOINTMENT (OUTPATIENT)
Dept: MAMMOGRAPHY | Facility: IMAGING CENTER | Age: 51
End: 2021-03-03
Payer: MEDICAID

## 2021-03-03 ENCOUNTER — RESULT REVIEW (OUTPATIENT)
Age: 51
End: 2021-03-03

## 2021-03-03 ENCOUNTER — OUTPATIENT (OUTPATIENT)
Dept: OUTPATIENT SERVICES | Facility: HOSPITAL | Age: 51
LOS: 1 days | End: 2021-03-03
Payer: MEDICAID

## 2021-03-03 DIAGNOSIS — Z00.8 ENCOUNTER FOR OTHER GENERAL EXAMINATION: ICD-10-CM

## 2021-03-03 PROCEDURE — 77067 SCR MAMMO BI INCL CAD: CPT

## 2021-03-03 PROCEDURE — 77063 BREAST TOMOSYNTHESIS BI: CPT

## 2021-03-03 PROCEDURE — 77063 BREAST TOMOSYNTHESIS BI: CPT | Mod: 26

## 2021-03-03 PROCEDURE — 77067 SCR MAMMO BI INCL CAD: CPT | Mod: 26

## 2021-05-03 ENCOUNTER — TRANSCRIPTION ENCOUNTER (OUTPATIENT)
Age: 51
End: 2021-05-03

## 2021-05-23 ENCOUNTER — TRANSCRIPTION ENCOUNTER (OUTPATIENT)
Age: 51
End: 2021-05-23

## 2021-06-15 ENCOUNTER — APPOINTMENT (OUTPATIENT)
Dept: GASTROENTEROLOGY | Facility: HOSPITAL | Age: 51
End: 2021-06-15
Payer: MEDICAID

## 2021-06-15 ENCOUNTER — OUTPATIENT (OUTPATIENT)
Dept: OUTPATIENT SERVICES | Facility: HOSPITAL | Age: 51
LOS: 1 days | End: 2021-06-15
Payer: MEDICAID

## 2021-06-15 VITALS
DIASTOLIC BLOOD PRESSURE: 71 MMHG | BODY MASS INDEX: 22.88 KG/M2 | HEART RATE: 67 BPM | HEIGHT: 58.5 IN | WEIGHT: 112 LBS | TEMPERATURE: 96 F | SYSTOLIC BLOOD PRESSURE: 109 MMHG

## 2021-06-15 DIAGNOSIS — Z12.11 ENCOUNTER FOR SCREENING FOR MALIGNANT NEOPLASM OF COLON: ICD-10-CM

## 2021-06-15 DIAGNOSIS — K21.9 GASTRO-ESOPHAGEAL REFLUX DISEASE W/OUT ESOPHAGITIS: ICD-10-CM

## 2021-06-15 DIAGNOSIS — R10.9 UNSPECIFIED ABDOMINAL PAIN: ICD-10-CM

## 2021-06-15 DIAGNOSIS — Z80.0 FAMILY HISTORY OF MALIGNANT NEOPLASM OF DIGESTIVE ORGANS: ICD-10-CM

## 2021-06-15 DIAGNOSIS — A04.8 OTHER SPECIFIED BACTERIAL INTESTINAL INFECTIONS: ICD-10-CM

## 2021-06-15 DIAGNOSIS — K21.9 GASTRO-ESOPHAGEAL REFLUX DISEASE WITHOUT ESOPHAGITIS: ICD-10-CM

## 2021-06-15 PROCEDURE — G0463: CPT

## 2021-06-15 PROCEDURE — 99203 OFFICE O/P NEW LOW 30 MIN: CPT | Mod: GC

## 2021-06-15 NOTE — PHYSICAL EXAM
[General Appearance - Alert] : alert [General Appearance - In No Acute Distress] : in no acute distress [Sclera] : the sclera and conjunctiva were normal [PERRL With Normal Accommodation] : pupils were equal in size, round, and reactive to light [Examination Of The Oral Cavity] : the lips and gums were normal [Oropharynx] : the oropharynx was normal [Neck Appearance] : the appearance of the neck was normal [Apical Impulse] : the apical impulse was normal [Exaggerated Use Of Accessory Muscles For Inspiration] : no accessory muscle use [Heart Rate And Rhythm] : heart rate was normal and rhythm regular [Bowel Sounds] : normal bowel sounds [Abdomen Soft] : soft [Abdomen Tenderness] : non-tender [Abnormal Walk] : normal gait [Musculoskeletal - Swelling] : no joint swelling seen [] : no rash [Skin Lesions] : no skin lesions [Motor Exam] : the motor exam was normal [No Focal Deficits] : no focal deficits [Oriented To Time, Place, And Person] : oriented to person, place, and time [Affect] : the affect was normal

## 2021-06-15 NOTE — REVIEW OF SYSTEMS
[Cough] : cough [Heartburn] : heartburn [Fever] : no fever [Chills] : no chills [Eye Pain] : no eye pain [Red Eyes] : eyes not red [Chest Pain] : no chest pain [Palpitations] : no palpitations [Shortness Of Breath] : no shortness of breath [Wheezing] : no wheezing [Abdominal Pain] : no abdominal pain [Vomiting] : no vomiting [Constipation] : no constipation [Diarrhea] : no diarrhea

## 2021-06-15 NOTE — END OF VISIT
[] : Fellow [FreeTextEntry3] : As modified and discussed with patient\par MD MAHAD Huerta [facility]\par Associate Professor of Medicine\par [facility] [employer]\par

## 2021-06-15 NOTE — HISTORY OF PRESENT ILLNESS
[de-identified] : N/A [de-identified] : N/A [de-identified] : N/A [de-identified] : 50 y/o F w/ hx of HLD and H. pylori infection referred to GI clinic for persistent acid reflux.\par \par The patient reports persistent acid reflux for the past 4 years. She has tried OTC medications, which were ineffective, but she doesn't recall the names of the medications. She otherwise denies difficulty swallowing, painful swallowing, nausea/vomiting, weight loss, bloody stools, black tarry stools, and changes in her bowel habits. She has never had an EGD. She reports a colonoscopy many many years ago.\par \par The patient was found to have H. pylori infection and was prescribed clarithromycin triple therapy by IM clinic.

## 2021-06-15 NOTE — ASSESSMENT
[FreeTextEntry1] : Impression:\par \par # Acid reflux: Patient only previously took OTC with no relief. No risk factors for BE, however, family history of gastric cancer. \par # H.pylori infection: Positive stool Ag and treated with Clarithromycin based triple therapy which has high resistance.\par # Colon cancer screening: No prior screening.\par \par Plan:\par - EGD/Colon; patient was informed of risk of procedure including bleeding, infection, perforation, missed lesions, and anesthesia.\par - Miralax and bisacodyl for bowel prep\par - Famotidine 20 mg PO BID PRN\par - RTC after procedures\par \par

## 2021-07-29 ENCOUNTER — RESULT REVIEW (OUTPATIENT)
Age: 51
End: 2021-07-29

## 2021-07-29 ENCOUNTER — OUTPATIENT (OUTPATIENT)
Dept: OUTPATIENT SERVICES | Facility: HOSPITAL | Age: 51
LOS: 1 days | End: 2021-07-29
Payer: MEDICAID

## 2021-07-29 VITALS
HEIGHT: 65 IN | WEIGHT: 111.99 LBS | TEMPERATURE: 98 F | RESPIRATION RATE: 14 BRPM | OXYGEN SATURATION: 99 % | SYSTOLIC BLOOD PRESSURE: 111 MMHG | HEART RATE: 77 BPM | DIASTOLIC BLOOD PRESSURE: 69 MMHG

## 2021-07-29 VITALS
RESPIRATION RATE: 15 BRPM | OXYGEN SATURATION: 99 % | HEART RATE: 73 BPM | SYSTOLIC BLOOD PRESSURE: 109 MMHG | DIASTOLIC BLOOD PRESSURE: 73 MMHG

## 2021-07-29 DIAGNOSIS — Z98.891 HISTORY OF UTERINE SCAR FROM PREVIOUS SURGERY: Chronic | ICD-10-CM

## 2021-07-29 DIAGNOSIS — Z12.11 ENCOUNTER FOR SCREENING FOR MALIGNANT NEOPLASM OF COLON: ICD-10-CM

## 2021-07-29 PROCEDURE — 43239 EGD BIOPSY SINGLE/MULTIPLE: CPT

## 2021-07-29 PROCEDURE — G0121: CPT

## 2021-07-29 PROCEDURE — 88305 TISSUE EXAM BY PATHOLOGIST: CPT | Mod: 26

## 2021-07-29 PROCEDURE — 45378 DIAGNOSTIC COLONOSCOPY: CPT

## 2021-07-29 PROCEDURE — 88305 TISSUE EXAM BY PATHOLOGIST: CPT

## 2021-07-29 RX ORDER — SODIUM CHLORIDE 9 MG/ML
500 INJECTION, SOLUTION INTRAVENOUS
Refills: 0 | Status: DISCONTINUED | OUTPATIENT
Start: 2021-07-29 | End: 2021-08-12

## 2021-07-29 RX ORDER — LORATADINE 10 MG/1
1 TABLET ORAL
Qty: 0 | Refills: 0 | DISCHARGE

## 2021-07-29 NOTE — ASU PREOP CHECKLIST - WAS PATIENT ON BETA BLOCKER?
[facility]    Hospitalist Progress Note  Name: Matt Way    MRN: [id number]  Provider:  Jenelle Hardin PA-C  Date of Service: 06/08/2018    Assessment & Plan   Summary of Stay: Matt Way is a 66 year old male with PMH significant for DM2, HTN, GERD, FLAQUITA, and HLP who was admitted on 6/7/2018 for evaluation of chest pain.     1. NSTEMI: admitted with chest discomfort radiating to the arm responding to nitroglycerin with serial troponins rising up to 0.835 this morning. Findings consistent with NSTEMI. Cardiology consulted and saw the patient this morning with recommendation for coronary angiogram today with possible revascularization.   - Monitor on telemetry  - Obtain echocardiogram   - Continue ASA   - Start PO Lisinopril 2.5 mg daily, Metoprolol 25 mg BID, and Atorvastatin 40 mg   - IV Heparin gtt  - PRN sublingual nitroglycerin and IV Morphine for recurrent pain   - Cardiology consulted and recommended coronary angiogram today     2. HTN: mildly elevated BPs up to 170s/90s overnight.  - Start Lisinopril 2.5 mg daily and Metoprolol 25 mg BID per cardiology's recommendations    3. Hypoglycemic event in setting of DM2: occurred in the ED after not eating throughout the day and took his regular insulin dosing in AM. Resolved with 1 amp of D50 in the ED. Home regimen includes Levemir 20 units BID, Novolog 14 units BID, and Metformin 1,000 mg BID. Last HgbA1C of 7.9 on 5/24/18.   - Levemir 10 units this AM due to being NPO  - NPO SSI ordered   - Hold Metformin     4. HLP: no recent lipid panels and not currently on statin therapy.  - Check lipid panel  - Start Atorvastatin 40 mg daily     5. FLAQUITA: intolerant to CPAP  - PRN supplemental oxygen while sleeping     # Pain Assessment:  Current Pain Score 6/8/2018   Patient currently in pain? denies   Matt s pain level was assessed and he currently denies pain.      DVT Prophylaxis: IV Heparin gtt  Code Status: Full Code  Disposition: Expected discharge in  1-2 days pending results of coronary angiogram, will admit to inpatient      Interval History   Patient currently denies chest pain, shortness of breath, N/V, diaphoresis, or recent illness. Patient states he has been chest pain free since yesterday. He expresses understanding of current plan for angiogram this afternoon.     -Data reviewed today: I reviewed all new labs and imaging reports over the last 24 hours. I personally reviewed all labs and imaging from this visit.     Physical Exam   Temp: 96.5  F (35.8  C) Temp src: Oral BP: 159/86 Pulse: 77 Heart Rate: 86 Resp: 16 SpO2: 97 % O2 Device: None (Room air)    Vitals:    06/07/18 1303 06/08/18 0907   Weight: 94.3 kg (208 lb) 91 kg (200 lb 9.6 oz)     Vital Signs with Ranges  Temp:  [95.9  F (35.5  C)-98  F (36.7  C)] 96.5  F (35.8  C)  Pulse:  [77-87] 77  Heart Rate:  [65-97] 86  Resp:  [10-19] 16  BP: (116-170)/(68-93) 159/86  SpO2:  [93 %-98 %] 97 %       GEN:  Alert, oriented x 3, appears comfortable, NAD.  HEENT:  Normocephalic/atraumatic, no scleral icterus, no nasal discharge, mouth moist.  CV:  Regular rate and rhythm, no murmur or JVD.  S1 + S2 noted, no S3 or S4.  LUNGS:  Clear to auscultation bilaterally without rales/rhonchi/wheezing/retractions. Symmetric chest rise on inhalation noted.  ABD:  Active bowel sounds, soft, non-tender/non-distended.  No rebound/guarding/rigidity.  EXT:  No edema.  No cyanosis.  No acute joint synovitis noted.  SKIN:  Dry to touch, no exanthems noted in the visualized areas.    Medications       aspirin  81 mg Oral Daily     atorvastatin  40 mg Oral Daily     insulin detemir  20 Units Subcutaneous BID     lisinopril  2.5 mg Oral Daily     metFORMIN  1,000 mg Oral BID w/meals     metoprolol tartrate  25 mg Oral BID     sodium chloride (PF)  3 mL Intracatheter Q8H     Data   Results for orders placed or performed during the hospital encounter of 06/07/18   Chest XR,  PA & LAT    Narrative    XR CHEST 2 VW 6/7/2018 2:30  PM    COMPARISON: None.    HISTORY: Chest pain.      Impression    IMPRESSION: Cardiac silhouette and pulmonary vasculature are within  normal limits. No focal airspace disease, pleural effusion or  pneumothorax.    CLIFF SUE MD   CBC with platelets differential   Result Value Ref Range    WBC 6.4 4.0 - 11.0 10e9/L    RBC Count 5.54 4.4 - 5.9 10e12/L    Hemoglobin 15.5 13.3 - 17.7 g/dL    Hematocrit 47.2 40.0 - 53.0 %    MCV 85 78 - 100 fl    MCH 28.0 26.5 - 33.0 pg    MCHC 32.8 31.5 - 36.5 g/dL    RDW 14.3 10.0 - 15.0 %    Platelet Count 228 150 - 450 10e9/L    Diff Method Automated Method     % Neutrophils 57.0 %    % Lymphocytes 32.8 %    % Monocytes 7.2 %    % Eosinophils 2.0 %    % Basophils 0.8 %    % Immature Granulocytes 0.2 %    Nucleated RBCs 0 0 /100    Absolute Neutrophil 3.7 1.6 - 8.3 10e9/L    Absolute Lymphocytes 2.1 0.8 - 5.3 10e9/L    Absolute Monocytes 0.5 0.0 - 1.3 10e9/L    Absolute Eosinophils 0.1 0.0 - 0.7 10e9/L    Absolute Basophils 0.1 0.0 - 0.2 10e9/L    Abs Immature Granulocytes 0.0 0 - 0.4 10e9/L    Absolute Nucleated RBC 0.0    Basic metabolic panel   Result Value Ref Range    Sodium 139 133 - 144 mmol/L    Potassium 3.9 3.4 - 5.3 mmol/L    Chloride 106 94 - 109 mmol/L    Carbon Dioxide 24 20 - 32 mmol/L    Anion Gap 9 3 - 14 mmol/L    Glucose 112 (H) 70 - 99 mg/dL    Urea Nitrogen 17 7 - 30 mg/dL    Creatinine 0.88 0.66 - 1.25 mg/dL    GFR Estimate 64 >60 mL/min/1.7m2    GFR Estimate If Black 78 >60 mL/min/1.7m2    Calcium 8.7 8.5 - 10.1 mg/dL   Troponin I   Result Value Ref Range    Troponin I ES 0.042 0.000 - 0.045 ug/L   Glucose by meter   Result Value Ref Range    Glucose 36 (LL) 70 - 99 mg/dL   Glucose by meter   Result Value Ref Range    Glucose 124 (H) 70 - 99 mg/dL   Glucose by meter   Result Value Ref Range    Glucose 155 (H) 70 - 99 mg/dL   Troponin I - Now then in 4 hours x 2    Result Value Ref Range    Troponin I ES 0.338 (HH) 0.000 - 0.045 ug/L   Troponin I - Now then  in 4 hours x 2    Result Value Ref Range    Troponin I ES 0.556 (HH) 0.000 - 0.045 ug/L   Troponin I   Result Value Ref Range    Troponin I ES 0.835 (HH) 0.000 - 0.045 ug/L   Glucose by meter   Result Value Ref Range    Glucose 160 (H) 70 - 99 mg/dL   EKG 12 lead   Result Value Ref Range    Interpretation ECG Click View Image link to view waveform and result    Cardiology IP Consult: Patient to be seen: Routine - within 24 hours; chest pain, elevated trop; Consultant may enter orders: Yes    Narrative    Wilber Strickland MD     6/8/2018  8:59 AM  Cardiology consult dictated  (#[id number])   Troponin POCT   Result Value Ref Range    Troponin I 0.02 0.00 - 0.10 ug/L     Ruth Hardin PA-C     No

## 2021-07-29 NOTE — PRE PROCEDURE NOTE - PRE PROCEDURE EVALUATION
Pre-Endoscopy Evaluation    Attending Physician: Carrillo    Procedure: EGD + Colonoscopy     Indication for Procedure: GERD, screening    Pertinent History: See [employer] chart    PAST MEDICAL & SURGICAL HISTORY:  No pertinent past medical history    No significant past surgical history        Allergies    No Known Allergies    Intolerances        Medications: MEDICATIONS  (STANDING):    MEDICATIONS  (PRN):      Pertinent lab data:                      Physical Examination:  Daily     Daily   Vital Signs Last 24 Hrs  T(C): --  T(F): --  HR: --  BP: --  BP(mean): --  RR: --  SpO2: --  Constitutional: NAD  HEENT: PERRLA, EOMI,    Neck:  No JVD  Respiratory: CTAB/L  Cardiovascular: S1 and S2  Gastrointestinal: BS+, soft, NT/ND  Extremities: No peripheral edema  Neurological: A/O x 3, no focal deficits  Psychiatric: Normal mood, normal affect  : No [place]  Skin: No rashes    Comments:    ASA Class: I []  II []  III []  IV []    The patient is a suitable candidate for the planned procedure unless box checked [ ]  No, explain:

## 2021-08-02 LAB — SURGICAL PATHOLOGY STUDY: SIGNIFICANT CHANGE UP

## 2022-04-21 ENCOUNTER — APPOINTMENT (OUTPATIENT)
Dept: INTERNAL MEDICINE | Facility: CLINIC | Age: 52
End: 2022-04-21
Payer: MEDICAID

## 2022-04-21 ENCOUNTER — OUTPATIENT (OUTPATIENT)
Dept: OUTPATIENT SERVICES | Facility: HOSPITAL | Age: 52
LOS: 1 days | End: 2022-04-21
Payer: MEDICAID

## 2022-04-21 VITALS
HEIGHT: 59 IN | OXYGEN SATURATION: 99 % | BODY MASS INDEX: 22.18 KG/M2 | SYSTOLIC BLOOD PRESSURE: 106 MMHG | HEART RATE: 73 BPM | WEIGHT: 110 LBS | DIASTOLIC BLOOD PRESSURE: 60 MMHG

## 2022-04-21 DIAGNOSIS — I10 ESSENTIAL (PRIMARY) HYPERTENSION: ICD-10-CM

## 2022-04-21 DIAGNOSIS — J30.9 ALLERGIC RHINITIS, UNSPECIFIED: ICD-10-CM

## 2022-04-21 DIAGNOSIS — Z98.891 HISTORY OF UTERINE SCAR FROM PREVIOUS SURGERY: Chronic | ICD-10-CM

## 2022-04-21 PROCEDURE — 99212 OFFICE O/P EST SF 10 MIN: CPT | Mod: GE

## 2022-04-21 PROCEDURE — G0463: CPT

## 2022-04-21 RX ORDER — POLYETHYLENE GLYCOL 3350 17 G/17G
17 POWDER, FOR SOLUTION ORAL
Qty: 1 | Refills: 0 | Status: DISCONTINUED | COMMUNITY
Start: 2021-06-15 | End: 2022-04-21

## 2022-04-21 RX ORDER — FAMOTIDINE 20 MG/1
20 TABLET, FILM COATED ORAL TWICE DAILY
Qty: 120 | Refills: 0 | Status: DISCONTINUED | COMMUNITY
Start: 2021-06-15 | End: 2022-04-21

## 2022-04-21 RX ORDER — OMEPRAZOLE 40 MG/1
40 CAPSULE, DELAYED RELEASE ORAL
Qty: 30 | Refills: 3 | Status: DISCONTINUED | COMMUNITY
Start: 2020-02-06 | End: 2022-04-21

## 2022-04-21 RX ORDER — BISACODYL 5 MG/1
5 TABLET ORAL
Qty: 4 | Refills: 0 | Status: DISCONTINUED | COMMUNITY
Start: 2021-06-15 | End: 2022-04-21

## 2022-04-21 NOTE — PHYSICAL EXAM
[Normal Outer Ear/Nose] : the outer ears and nose were normal in appearance [No JVD] : no jugular venous distention [No Lymphadenopathy] : no lymphadenopathy [No Edema] : there was no peripheral edema [No Extremity Clubbing/Cyanosis] : no extremity clubbing/cyanosis [Normal] : affect was normal and insight and judgment were intact

## 2022-04-22 NOTE — PLAN
[FreeTextEntry1] : #HCM \par -rec to get zoster vaccine at pharmacy \par -will defer blood work at this visit, patient in agreement with plan. blood work in Jan 2021 grossly normal CMP, CBC \par -lipid panel, a1c, cbc cmp at next visit \par -rtc in one year for annual physical \par -up to date with covid shots \par -patient will schedule mammogram

## 2022-04-22 NOTE — HISTORY OF PRESENT ILLNESS
[Ad Hoc ] : provided by an ad hoc  [FreeTextEntry1] : re-fill on medications [Interpreters_Relationshiptopatient] : Daughter [de-identified] : 52y F PMHx GERD, chronic allergies, h. pylori gastritis, p/w cpe. Patient has been in her usual state of health for the past year and denies and hospitalization or sicknesses/ She works as a  and is constantly exposed to dust and other particles in the home and has seasonal allergies and is requesting refills on medications. She takes , loratadine, fluticasone, and montelukast and these help her symptoms.

## 2022-04-22 NOTE — HEALTH RISK ASSESSMENT
[Patient reported mammogram was normal] : Patient reported mammogram was normal [Patient reported PAP Smear was normal] : Patient reported PAP Smear was normal [Patient reported colonoscopy was normal] : Patient reported colonoscopy was normal [Very Good] : ~his/her~  mood as very good [Never] : Never [Yes] : Yes [Monthly or less (1 pt)] : Monthly or less (1 point) [1 or 2 (0 pts)] : 1 or 2 (0 points) [Never (0 pts)] : Never (0 points) [MammogramDate] : 03/21 [MammogramComments] : repeat in 1 year from date, small nodularity in left breast  [PapSmearDate] : 02/21 [ColonoscopyDate] : 07/21 [ColonoscopyComments] : repeat colonscopy in 10 years from date

## 2022-04-22 NOTE — END OF VISIT
[] : Resident [FreeTextEntry3] : 53yo F with PMhx of H. pylori and allergic rhinitis who presents for CPE. Bloodwork 1 year ago fairly unremarkable, a1c normal and lipids with very mild HLD in 2020. Discussed labs today vs labs next CPE (every 3 years for lipids, due Feb 2023 and a1c stable since 2014), patient prefers to defer labs

## 2022-04-27 DIAGNOSIS — A04.8 OTHER SPECIFIED BACTERIAL INTESTINAL INFECTIONS: ICD-10-CM

## 2022-04-27 DIAGNOSIS — J30.9 ALLERGIC RHINITIS, UNSPECIFIED: ICD-10-CM

## 2022-08-01 DIAGNOSIS — U07.1 COVID-19: ICD-10-CM

## 2022-08-01 RX ORDER — NIRMATRELVIR AND RITONAVIR 150-100 MG
10 X 150 MG & KIT ORAL
Qty: 10 | Refills: 0 | Status: DISCONTINUED | COMMUNITY
Start: 2022-08-01 | End: 2022-08-01

## 2022-08-03 ENCOUNTER — NON-APPOINTMENT (OUTPATIENT)
Age: 52
End: 2022-08-03

## 2022-08-04 ENCOUNTER — NON-APPOINTMENT (OUTPATIENT)
Age: 52
End: 2022-08-04

## 2022-08-06 ENCOUNTER — NON-APPOINTMENT (OUTPATIENT)
Age: 52
End: 2022-08-06

## 2022-08-08 ENCOUNTER — NON-APPOINTMENT (OUTPATIENT)
Age: 52
End: 2022-08-08

## 2022-09-22 NOTE — PRE-ANESTHESIA EVALUATION ADULT - NSPROPOSEDPROCEDFT_GEN_ALL_CORE
Progress Note  Infectious Disease    Reason for Consult:  GPC bacteremia    HPI: Dwight Hoffmann is a 55 y.o. male very pleasant, with past medical history of diabetes, HLD who presented 9/14 complaining of night sweats, unintentional 30 lb weight loss for the last couple of months.  Symptoms worsened by severe abdominal pain, urinary hesitancy, dysuria, increased urinary frequency, occasional bloody stool, with associated weakness, fatigue, and generalized pain.  He denies fever or chills, no nausea or vomiting, no chest pain or shortness of breath.  Patient was seen by primary care last month due to melena, refer to GI, colonoscopy done on 09/01 were 3 polyps were resected, pathology report consistent with tubular adenomas.      In the ER, blood pressure 113/69, T-max a 100.9°   Labs on admission white count 11.3, monocytic predominance 20%, bands 2%, H&H 9.6/28.5, MCV 74, microcytic anemia, platelet count 395   Normal kidney and liver function.    Lactic acid 1, normal   UA negative nitrates, no mottled differential performed   CT abdomen/pelvis revealed a 12 cm necrotic mass or abscess in the abdomen, with fistula formation to several adjacent small bowel loops. Prostatomegaly necessitating correlation with PSA.    Seen by [facility] surgery, status post ex lap for small bowel obstruction in the setting of necrotic large colonic mass with fistula formation, washout, and colon anastomosis.    Empirically started on Zosyn.  Switched to vancomycin, cefepime, Flagyl on 9/15.    ID consult for Gram-positive cocci in 1/2 bottles.    INTERVAL HISTORY:  9/16: Interim reviewed, patient seen and examined at bedside, anxious regarding clinical condition. Hemodynamically stable, afebrile in the last 24h. States he is burping, not passing gas since yesterday. Dysuria and hesitancy are improved. Labs reviewed, leukocytosis down to 12.8, no left shift, normal monocyte count. H/H 8.1/24.8, MCV: 76, plt: 482. Normal  electrolytes, liver and kidney function. AFP and PSA negative. Micro reviewed, OR cultures GNR, ID and sensitivities pending, blood cultures 1/4 bottles CoNS likely a contaminant. Repeat blood culture x 2 no growth to date, pending final.     9/17 (Nanette):  Case discussed with Dr. Mooney.  NG tube was found to be coiled in his mouth last night and was removed not replaced secondary to patient refusal.  AMA was signed by patient but subsequently the tube was replaced and is draining bilious fluid.  KUB reviewed. No flatus yet.  He is afebrile, white blood cells 12,800.  Abscess cultures reviewed with Enterobacter, not very sensitive, and a pansensitive Proteus.  CEA, CA 19 9, alpha fetoprotein, PSA all negative.  9/18: interim reviewed. Afebrile. Continues to require the NGT for slow return of bowel function. No new micro data. He has not had any pain medication since yesterday. Walking in the [place].     9/19 (Vinicio): Interim reviewed, discussed with Dr Fitzpatrick. Patient seen and examined at bedside, after session with PT. States his hungry, and is anxious regarding NG tube and not being able to pass gas. Hemodynamically stable, afebrile. Labs reviewed, stable WBC, no left shift, H/H 8.1/25.5, plt: 633. Stable kidney and liver function tests. No new micro data. Awaiting pathology report.     9/20: Interim reviewed, patient seen and examined at bedside. Discussed with Surgery yesterday, NG tube repositioned, patient is finally passing gas, output from NG tube decreased. Hemodynamically stable, afebrile. Labs reviewed, stable WBC, no left shift. H/H 8.4/25.9. plt: 735, likely reactive thrombocytosis. Stable kidney and liver function. Micro reviewed, repeat blood cultures  no growth. Path lab called for report, Pathologist to call with prelim read.    10 x 7.5 by 3.5 cm,     09/21/2022 No new complains, abd dyscomfort expected, but no pain  Afebrile. Going for   Bone marrow Bx tomorrow  Night sweats have resolved    09/22/2022  Ct chest done for staging- neg  Bone marrow Bx  done today  Patietn if feeling better in general. Multiple consultants are discussing best option to tx    EXAM & DIAGNOSTICS REVIEWED:   Vitals:     Temp:  [97.5 °F (36.4 °C)-98.6 °F (37 °C)]   Temp: 98.5 °F (36.9 °C) (09/22/22 1959)  Pulse: 81 (09/22/22 1959)  Resp: 17 (09/22/22 1959)  BP: 133/71 (09/22/22 1959)  SpO2: 100 % (09/22/22 2005)    Intake/Output Summary (Last 24 hours) at 9/22/2022 2157  Last data filed at 9/22/2022 1815  Gross per 24 hour   Intake 2291.51 ml   Output 1100 ml   Net 1191.51 ml       General:  In NAD. Alert and attentive, cooperative, comfortable  Eyes:  Anicteric, EOMI  ENT:  NG tube in place, no ulcers, exudates, thrush, nares patent, dentition is good  Neck:  Supple  Lungs: Clear to auscultation b/l  Heart:  S1/S2+, regular rhythm, no murmurs  Abd:  S/p Ex-lap,. Dressing in place, staples in place, no redness noted, slightly distended but soft, + BS, non tender to palpation, no drains  :  Voids, urine clear  Musc:  Joints without effusion, swelling,  erythema, synovitis, ambulatory  Skin:  Warm, no rash  Wound: Ex-lap,  wound covered   Neuro:  Following commands, no acute focal deficit   Psych:  Calm, cooperative  Lymphatic:       Extrem: Left arm edema due to IV infiltration- improved  VAD:  peripheral       Isolation:  none  09/22/2022   Sx wound healing    9/14:        General Labs reviewed:  Recent Labs   Lab 09/20/22  0433 09/21/22  0516 09/22/22  0452   WBC 12.48 15.03* 10.75   HGB 8.4* 8.6* 9.2*   HCT 25.9* 26.5* 29.2*   * 754* 730*       Recent Labs   Lab 09/20/22  0433 09/21/22  0516 09/22/22  0452    139 140   K 3.8 4.0 3.7    105 106   CO2 25 24 25   BUN 10 12 12   CREATININE 0.7 0.7 0.8   CALCIUM 8.5* 8.8 8.4*   PROT 6.5 7.0 6.6   BILITOT 0.7 0.7 0.8   ALKPHOS 66 79 103   ALT 17 19 28   AST 27 36 46*     No results for input(s): CRP in the last 168 hours.  No results for input(s): SEDRATE in  the last 168 hours.    Estimated Creatinine Clearance: 134.1 mL/min (based on SCr of 0.8 mg/dL).     Micro:  Microbiology Results (last 7 days)       Procedure Component Value Units Date/Time    Blood culture [[id number]] Collected: 09/15/22 1722    Order Status: Completed Specimen: Blood from Antecubital, Right Updated: 09/21/22 0612     Blood Culture, Routine No growth after 5 days.    Blood culture [[id number]] Collected: 09/15/22 1819    Order Status: Completed Specimen: Blood from Antecubital, Right Updated: 09/21/22 0612     Blood Culture, Routine No growth after 5 days.    Blood culture (site 2) [[id number]] Collected: 09/14/22 0315    Order Status: Completed Specimen: Blood Updated: 09/19/22 1212     Blood Culture, Routine No growth after 5 days.    Narrative:      Site # 2, aerobic only    Culture, Anaerobe [[id number]] Collected: 09/14/22 1525    Order Status: Completed Specimen: Abscess from Abdomen Updated: 09/19/22 0717     Anaerobic Culture No anaerobes isolated    Aerobic culture [[id number]]  (Abnormal)  (Susceptibility) Collected: 09/14/22 1525    Order Status: Completed Specimen: Abscess from Abdomen Updated: 09/17/22 1320     Aerobic Bacterial Culture ENTEROBACTER CLOACAE  Few        PROTEUS MIRABILIS  Few  No other significant isolate      Blood culture (site 1) [[id number]]  (Abnormal) Collected: 09/14/22 0320    Order Status: Completed Specimen: Blood Updated: 09/17/22 0946     Blood Culture, Routine Gram stain aer bottle: Gram positive cocci in clusters resembling Staph      Results called to and read back by: Poncho Brunson RN  09/15/2022      12:31      COAGULASE-NEGATIVE STAPHYLOCOCCUS SPECIES  Organism is a probable contaminant      Narrative:      Site # 1, aerobic and anaerobic            Pathology:  9/1 colonoscopy:  1. ASCENDING COLON, POLYPECTOMY:   - TUBULAR ADENOMA.   2. CECUM, POLYPECTOMY:   - TUBULAR ADENOMA.   3. CECUM, POLYPECTOMY:   - TUBULAR ADENOMA.     Imaging Reviewed:  CXR  clear  CT abdomen/pelvis 9/20: Postoperative abdomen.  Deep abdominal fluid collection, compatible with abscess.  The collection is not excessive Espinoza percutaneously. Mildly dilated small bowel compatible with ileus.  Nasogastric tube in the distal stomach.     CT abdomen/pelvis 9/14:  1. 12 cm necrotic mass or abscess in the abdomen, with fistula formation to several adjacent small bowel loops.  2. Prostatomegaly necessitating correlation with PSA.    Cardiology: ECHO 9/15/22:  The left ventricle is normal in size with concentric remodeling and normal systolic function.  The estimated ejection fraction is 60%.  Grade I left ventricular diastolic dysfunction.  Normal right ventricular size with normal right ventricular systolic function.  Mild left atrial enlargement.  Mild mitral regurgitation.  Mild to moderate tricuspid regurgitation.  Normal central venous pressure (3 mmHg).  The estimated PA systolic pressure is 49 mmHg.  There is pulmonary hypertension.  Mild right atrial enlargement.  No vegetations were seen       IMPRESSION & PLAN     Severe sepsis resolved, secondary to perforated necrotic small bowel mass with abscess status post ex lap/drainage/small-bowel resection with anastomosis 9/14 in the setting of DLBCL       Blood cultures 1/4 bottles grew CoNs, likely a contaminant            Repeat blood cultures x 2 no growth            OR cultures Enterobacter (intermediate to cefepime and to Zosyn) and Proteus mirabilis sensitive to Merrem    Remaining 10 x 7.5 by 3.5 cm abscess            AFP, Ca-19, CEA negative and PSA 0.9 bessie  Ileus - post-op; NG tube repositioned, passing gas since 9/19  DLBCL- diagnosed by path 09/14  PMHx: diabetes and HLD    Recommendations:  --Continue Meropenem 1g IV q8h for now, will likely need IV abx until abscess is resolved  I dw Dr Oliveira,  gen sx  and dr Reid (hem onc.) My concern is: what  if tx of  abscess takes too long and postpones/ jeopardizes the tx of  lympoma. Patient has been informed about 2 options: ind or not. He will think about it  -- Follow CRP trend  -- s/p BMBx today  --- PICC  Advance diet as tolerated  Aspiration precautions,  Incentive spirometry  PT/OT as tolerated  D/w patient, nursing     Medical Decision Making during this encounter was  [_] Low Complexity  [_] Moderate Complexity  [xx] High Complexity     EGD/colonoscopy

## 2023-05-03 NOTE — END OF VISIT
Patient Call    Who are you speaking with?  of medication   If it is not the patient, are they listed on an active communication consent form? Yes? What is the reason for this call? Need to confirm patient received medication   Does this require a call back? yes   If a call back is required, please list [facility] call back number [phone]   If a call back is required, advise that a message will be forwarded to their care team and someone will return their call as soon as possible  Did you relay this information to the patient?  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 Will hold PPI x 2 weeks and check stool h. pylori. F/U with GI for possible EGD and colonoscopy.\par Also on allergy meds (flonase, singulair, loratadine).\par Needs colonoscopy. Had mammogram 2017 --> due for repeat.\par Agree with additional plan as per Dr. Cabrera.

## 2023-06-20 ENCOUNTER — APPOINTMENT (OUTPATIENT)
Dept: INTERNAL MEDICINE | Facility: CLINIC | Age: 53
End: 2023-06-20
Payer: MEDICAID

## 2023-06-20 ENCOUNTER — RESULT REVIEW (OUTPATIENT)
Age: 53
End: 2023-06-20

## 2023-06-20 ENCOUNTER — OUTPATIENT (OUTPATIENT)
Dept: OUTPATIENT SERVICES | Facility: HOSPITAL | Age: 53
LOS: 1 days | End: 2023-06-20
Payer: MEDICAID

## 2023-06-20 VITALS
OXYGEN SATURATION: 99 % | SYSTOLIC BLOOD PRESSURE: 110 MMHG | DIASTOLIC BLOOD PRESSURE: 70 MMHG | BODY MASS INDEX: 21.81 KG/M2 | HEART RATE: 71 BPM | WEIGHT: 108 LBS

## 2023-06-20 DIAGNOSIS — G56.00 CARPAL TUNNEL SYNDROME, UNSPECIFIED UPPER LIMB: ICD-10-CM

## 2023-06-20 DIAGNOSIS — Z00.00 ENCOUNTER FOR GENERAL ADULT MEDICAL EXAMINATION W/OUT ABNORMAL FINDINGS: ICD-10-CM

## 2023-06-20 DIAGNOSIS — I10 ESSENTIAL (PRIMARY) HYPERTENSION: ICD-10-CM

## 2023-06-20 DIAGNOSIS — Z98.891 HISTORY OF UTERINE SCAR FROM PREVIOUS SURGERY: Chronic | ICD-10-CM

## 2023-06-20 DIAGNOSIS — M25.519 PAIN IN UNSPECIFIED SHOULDER: ICD-10-CM

## 2023-06-20 DIAGNOSIS — M19.90 UNSPECIFIED OSTEOARTHRITIS, UNSPECIFIED SITE: ICD-10-CM

## 2023-06-20 DIAGNOSIS — G47.00 INSOMNIA, UNSPECIFIED: ICD-10-CM

## 2023-06-20 DIAGNOSIS — A04.8 OTHER SPECIFIED BACTERIAL INTESTINAL INFECTIONS: ICD-10-CM

## 2023-06-20 PROCEDURE — 83036 HEMOGLOBIN GLYCOSYLATED A1C: CPT

## 2023-06-20 PROCEDURE — 85025 COMPLETE CBC W/AUTO DIFF WBC: CPT

## 2023-06-20 PROCEDURE — G0463: CPT

## 2023-06-20 PROCEDURE — 80061 LIPID PANEL: CPT

## 2023-06-20 PROCEDURE — 99396 PREV VISIT EST AGE 40-64: CPT | Mod: GC

## 2023-06-20 PROCEDURE — 80048 BASIC METABOLIC PNL TOTAL CA: CPT

## 2023-06-20 RX ORDER — NAPROXEN 500 MG/1
500 TABLET, DELAYED RELEASE ORAL
Qty: 20 | Refills: 0 | Status: ACTIVE | COMMUNITY
Start: 2023-06-20 | End: 1900-01-01

## 2023-06-20 RX ORDER — RAMELTEON 8 MG/1
8 TABLET ORAL
Qty: 30 | Refills: 0 | Status: ACTIVE | COMMUNITY
Start: 2023-06-20 | End: 1900-01-01

## 2023-06-20 RX ORDER — NIRMATRELVIR AND RITONAVIR 300-100 MG
20 X 150 MG & KIT ORAL
Qty: 10 | Refills: 0 | Status: DISCONTINUED | COMMUNITY
Start: 2022-08-01 | End: 2023-06-20

## 2023-06-20 RX ORDER — LORATADINE 10 MG/1
10 TABLET ORAL
Qty: 90 | Refills: 3 | Status: ACTIVE | COMMUNITY
Start: 2019-01-17 | End: 1900-01-01

## 2023-06-20 RX ORDER — DICLOFENAC SODIUM 1% 10 MG/G
1 GEL TOPICAL
Qty: 1 | Refills: 2 | Status: ACTIVE | COMMUNITY
Start: 2023-06-20 | End: 1900-01-01

## 2023-06-20 RX ORDER — FLUTICASONE PROPIONATE 50 UG/1
50 SPRAY, METERED NASAL DAILY
Qty: 1 | Refills: 3 | Status: ACTIVE | COMMUNITY
Start: 2019-01-17 | End: 1900-01-01

## 2023-06-21 LAB
ANION GAP SERPL CALC-SCNC: 13 MMOL/L
BUN SERPL-MCNC: 11 MG/DL
CALCIUM SERPL-MCNC: 9.4 MG/DL
CHLORIDE SERPL-SCNC: 105 MMOL/L
CHOLEST SERPL-MCNC: 209 MG/DL
CO2 SERPL-SCNC: 27 MMOL/L
CREAT SERPL-MCNC: 0.73 MG/DL
EGFR: 99 ML/MIN/1.73M2
ESTIMATED AVERAGE GLUCOSE: 123 MG/DL
GLUCOSE SERPL-MCNC: 89 MG/DL
HBA1C MFR BLD HPLC: 5.9 %
HDLC SERPL-MCNC: 74 MG/DL
LDLC SERPL CALC-MCNC: 126 MG/DL
NONHDLC SERPL-MCNC: 136 MG/DL
POTASSIUM SERPL-SCNC: 4.4 MMOL/L
SODIUM SERPL-SCNC: 144 MMOL/L
TRIGL SERPL-MCNC: 51 MG/DL

## 2023-06-23 DIAGNOSIS — Z00.00 ENCOUNTER FOR GENERAL ADULT MEDICAL EXAMINATION WITHOUT ABNORMAL FINDINGS: ICD-10-CM

## 2023-06-23 DIAGNOSIS — A04.8 OTHER SPECIFIED BACTERIAL INTESTINAL INFECTIONS: ICD-10-CM

## 2023-06-23 DIAGNOSIS — M25.519 PAIN IN UNSPECIFIED SHOULDER: ICD-10-CM

## 2023-06-23 DIAGNOSIS — G47.00 INSOMNIA, UNSPECIFIED: ICD-10-CM

## 2023-06-23 DIAGNOSIS — G56.00 CARPAL TUNNEL SYNDROME, UNSPECIFIED UPPER LIMB: ICD-10-CM

## 2023-06-23 PROBLEM — M19.90 ARTHRITIS: Status: ACTIVE | Noted: 2023-06-20

## 2023-06-23 NOTE — PHYSICAL EXAM
[Normal] : normal gait, coordination grossly intact, no focal deficits and deep tendon reflexes were 2+ and symmetric [de-identified] : b/l shoulder tenderness on palpation, pain with active and passive movement of b/l arms, positive phalen test, no neck or cervical tenderness  Car

## 2023-06-23 NOTE — ASSESSMENT
[FreeTextEntry1] : 53 year F with PMH GERD, chronic allergies, H pylori presenting for CPE\par \par #b/l shoulder pain\par #carpal tunnel\par - likely MSK given exam and history\par - deferred PT given time constraints\par - trial 10 days naproxen, diclofenac cream, and wrist splints \par - consider imaging if symptoms continue \par \par #insomnia\par - reports trouble sleeping for several years. Has tried proper sleep hygiene and melatonin without success \par - trial ramelteon \par \par #HCM\par - pap UTD\par - mammo referral provided \par - colon UTD\par - address shingles vaccine at next visit \par \par RTC in 5 weeks \par \par Case d/w Dr. Pineda \par

## 2023-06-23 NOTE — HISTORY OF PRESENT ILLNESS
[de-identified] : 53 year F with PMH GERD, chronic allergies, H pylori presenting for CPE\par \par Reports b/l shoulder pain for 8 months. Reports intermittent pain that doesn't allow her to sleep. Also reports associated intermittent b/l elbow pain and numbness/tingling of hands. Reports minimal improvement with tylenol and Aleve. Denies falls or trauma. Cleans houses for a living \par \par Also reporting trouble sleeping for several years. Has tried proper sleep hygiene and melatonin

## 2023-06-23 NOTE — REVIEW OF SYSTEMS
[Joint Pain] : joint pain [Insomnia] : insomnia [Fever] : no fever [Chest Pain] : no chest pain [Shortness Of Breath] : no shortness of breath [Abdominal Pain] : no abdominal pain [Nausea] : no nausea [Diarrhea] : diarrhea [Vomiting] : no vomiting

## 2023-06-23 NOTE — END OF VISIT
[] : Resident [FreeTextEntry3] : 53F PMHx GERD, chronic allergies, H pylori (eradication confirmed) presenting for CPE. Patient complaining of b/l shoulder pain with wrist pain, suspected [facility] pathology given work as a . Will trial 10 day course of Meloxicam, diclofenac gel; for suspected carpal tunnel, will recommend b/l wrist splints. Patient also indicating insomnia with Melatonin providing insufficient relief. Will provide a trial of Ramelteon; if no relief on this trial, will refer for sleep study. Mammogram ordered, other preventative health maintenance items up to date. Will discuss shingles at next appointment.

## 2023-06-23 NOTE — HEALTH RISK ASSESSMENT
[Yes] : Yes [Monthly or less (1 pt)] : Monthly or less (1 point) [1 or 2 (0 pts)] : 1 or 2 (0 points) [Never] : Never [0] : 2) Feeling down, depressed, or hopeless: Not at all (0) [[id number]] : 0

## 2023-07-27 ENCOUNTER — APPOINTMENT (OUTPATIENT)
Dept: INTERNAL MEDICINE | Facility: CLINIC | Age: 53
End: 2023-07-27

## 2023-09-12 ENCOUNTER — APPOINTMENT (OUTPATIENT)
Dept: MAMMOGRAPHY | Facility: IMAGING CENTER | Age: 53
End: 2023-09-12
Payer: MEDICAID

## 2023-09-12 ENCOUNTER — OUTPATIENT (OUTPATIENT)
Dept: OUTPATIENT SERVICES | Facility: HOSPITAL | Age: 53
LOS: 1 days | End: 2023-09-12
Payer: MEDICAID

## 2023-09-12 ENCOUNTER — RESULT REVIEW (OUTPATIENT)
Age: 53
End: 2023-09-12

## 2023-09-12 DIAGNOSIS — Z98.891 HISTORY OF UTERINE SCAR FROM PREVIOUS SURGERY: Chronic | ICD-10-CM

## 2023-09-12 DIAGNOSIS — Z00.8 ENCOUNTER FOR OTHER GENERAL EXAMINATION: ICD-10-CM

## 2023-09-12 PROCEDURE — 77063 BREAST TOMOSYNTHESIS BI: CPT | Mod: 26

## 2023-09-12 PROCEDURE — 77063 BREAST TOMOSYNTHESIS BI: CPT

## 2023-09-12 PROCEDURE — 77067 SCR MAMMO BI INCL CAD: CPT

## 2023-09-12 PROCEDURE — 77067 SCR MAMMO BI INCL CAD: CPT | Mod: 26

## 2023-11-21 ENCOUNTER — APPOINTMENT (OUTPATIENT)
Dept: OPHTHALMOLOGY | Facility: CLINIC | Age: 53
End: 2023-11-21
Payer: MEDICAID

## 2023-11-21 ENCOUNTER — NON-APPOINTMENT (OUTPATIENT)
Age: 53
End: 2023-11-21

## 2023-11-21 PROCEDURE — 92015 DETERMINE REFRACTIVE STATE: CPT | Mod: NC

## 2023-11-21 PROCEDURE — 92004 COMPRE OPH EXAM NEW PT 1/>: CPT

## 2024-01-10 NOTE — PRE-ANESTHESIA EVALUATION ADULT - RESPIRATORY RATE (BREATHS/MIN)
----- Message from Laura Box PA-C sent at 1/10/2024  7:43 AM CST -----  Negative Nelida, repeat in 3 years.    14

## 2024-08-22 ENCOUNTER — APPOINTMENT (OUTPATIENT)
Dept: INTERNAL MEDICINE | Facility: CLINIC | Age: 54
End: 2024-08-22
Payer: MEDICAID

## 2024-08-22 ENCOUNTER — OUTPATIENT (OUTPATIENT)
Dept: OUTPATIENT SERVICES | Facility: HOSPITAL | Age: 54
LOS: 1 days | End: 2024-08-22
Payer: MEDICAID

## 2024-08-22 VITALS
OXYGEN SATURATION: 98 % | DIASTOLIC BLOOD PRESSURE: 56 MMHG | WEIGHT: 103 LBS | HEART RATE: 72 BPM | HEIGHT: 59 IN | SYSTOLIC BLOOD PRESSURE: 98 MMHG | BODY MASS INDEX: 20.76 KG/M2

## 2024-08-22 DIAGNOSIS — R73.03 PREDIABETES.: ICD-10-CM

## 2024-08-22 DIAGNOSIS — I10 ESSENTIAL (PRIMARY) HYPERTENSION: ICD-10-CM

## 2024-08-22 DIAGNOSIS — E78.5 HYPERLIPIDEMIA, UNSPECIFIED: ICD-10-CM

## 2024-08-22 DIAGNOSIS — Z00.00 ENCOUNTER FOR GENERAL ADULT MEDICAL EXAMINATION W/OUT ABNORMAL FINDINGS: ICD-10-CM

## 2024-08-22 PROCEDURE — 80061 LIPID PANEL: CPT

## 2024-08-22 PROCEDURE — 83036 HEMOGLOBIN GLYCOSYLATED A1C: CPT

## 2024-08-22 PROCEDURE — 80053 COMPREHEN METABOLIC PANEL: CPT

## 2024-08-22 PROCEDURE — 84443 ASSAY THYROID STIM HORMONE: CPT

## 2024-08-22 PROCEDURE — G0463: CPT

## 2024-08-22 PROCEDURE — 99213 OFFICE O/P EST LOW 20 MIN: CPT | Mod: GE

## 2024-08-22 RX ORDER — MAGNESIUM GLYCINATE 100 MG
100 TABLET ORAL AT BEDTIME
Qty: 30 | Refills: 0 | Status: ACTIVE | COMMUNITY
Start: 2024-08-22 | End: 1900-01-01

## 2024-08-22 RX ORDER — GABAPENTIN 100 MG/1
100 CAPSULE ORAL
Qty: 60 | Refills: 3 | Status: ACTIVE | COMMUNITY
Start: 2024-08-22 | End: 1900-01-01

## 2024-08-23 LAB
ALBUMIN SERPL ELPH-MCNC: 4.5 G/DL
ALP BLD-CCNC: 95 U/L
ALT SERPL-CCNC: 15 U/L
ANION GAP SERPL CALC-SCNC: 11 MMOL/L
AST SERPL-CCNC: 22 U/L
BILIRUB SERPL-MCNC: 0.2 MG/DL
BUN SERPL-MCNC: 20 MG/DL
CALCIUM SERPL-MCNC: 9.4 MG/DL
CHLORIDE SERPL-SCNC: 103 MMOL/L
CHOLEST SERPL-MCNC: 221 MG/DL
CO2 SERPL-SCNC: 25 MMOL/L
CREAT SERPL-MCNC: 0.73 MG/DL
EGFR: 98 ML/MIN/1.73M2
ESTIMATED AVERAGE GLUCOSE: 120 MG/DL
GLUCOSE SERPL-MCNC: 91 MG/DL
HBA1C MFR BLD HPLC: 5.8 %
HDLC SERPL-MCNC: 80 MG/DL
LDLC SERPL CALC-MCNC: 128 MG/DL
NONHDLC SERPL-MCNC: 141 MG/DL
POTASSIUM SERPL-SCNC: 4 MMOL/L
PROT SERPL-MCNC: 7.1 G/DL
SODIUM SERPL-SCNC: 138 MMOL/L
TRIGL SERPL-MCNC: 71 MG/DL
TSH SERPL-ACNC: 1.34 UIU/ML

## 2024-08-24 NOTE — ASSESSMENT
[FreeTextEntry1] : Ms. Tanner is a 55 YO woman with PMH of carpal tunnel, HLD, pre-diabetes, allergic rhinitis and insomnia who presents for CPE. Her main complaints are as follows:  # Fatigue - Likely 2/2 pain; gabapentin as below - Magnesium  # B/l upper extremity pain - Possibly cervical in nature - will obtain XR C-spine - Gabapentin for pain control - 100 for 3 days then 200  HCM - CBC, CMP, A1c, lipids - Mammogram ordered - GYN referral given  RTC in 5 weeks

## 2024-08-24 NOTE — ASSESSMENT
[FreeTextEntry1] : Ms. Tanner is a 53 YO woman with PMH of carpal tunnel, HLD, pre-diabetes, allergic rhinitis and insomnia who presents for CPE. Her main complaints are as follows:  # Fatigue - Likely 2/2 pain; gabapentin as below - Magnesium  # B/l upper extremity pain - Possibly cervical in nature - will obtain XR C-spine - Gabapentin for pain control - 100 for 3 days then 200  HCM - CBC, CMP, A1c, lipids - Mammogram ordered - GYN referral given  RTC in 5 weeks

## 2024-08-24 NOTE — HISTORY OF PRESENT ILLNESS
[FreeTextEntry1] : Insomnia [de-identified] : Ms. Tanner is a 53 YO woman with PMH of carpal tunnel, HLD, pre-diabetes, allergic rhinitis and insomnia who presents for CPE. Her main complaints are as follows:  # Fatigue She notes that for the past two years, she has been unable to sleep - unable to fall asleep at all. She partially attributes this to joint pain but overall her  notes that she does not sleep for 2-3 days at a time. The last time she was here, she was given ramelteon for sleep but it made her feel nauseous and did not help her sleep. Her neighbor gave her a bottle of clonazepam 2 mg which she cut in half and used and was able to sleep well with without side effects - she only was able to use this for 4 days.   # B/l upper extremity pain - She has pain in the shoulders, sometimes extends up to the neck. She uses Voltaren gel with some relief. She also notes some weakness of the upper extremities. She has facial asymmetry which occurred after her daughter's delivery 23 years ago - at that time she had sought medical care but was told it was too late for anything to be done.

## 2024-08-24 NOTE — HISTORY OF PRESENT ILLNESS
[FreeTextEntry1] : Insomnia [de-identified] : Ms. Tanner is a 55 YO woman with PMH of carpal tunnel, HLD, pre-diabetes, allergic rhinitis and insomnia who presents for CPE. Her main complaints are as follows:  # Fatigue She notes that for the past two years, she has been unable to sleep - unable to fall asleep at all. She partially attributes this to joint pain but overall her  notes that she does not sleep for 2-3 days at a time. The last time she was here, she was given ramelteon for sleep but it made her feel nauseous and did not help her sleep. Her neighbor gave her a bottle of clonazepam 2 mg which she cut in half and used and was able to sleep well with without side effects - she only was able to use this for 4 days.   # B/l upper extremity pain - She has pain in the shoulders, sometimes extends up to the neck. She uses Voltaren gel with some relief. She also notes some weakness of the upper extremities. She has facial asymmetry which occurred after her daughter's delivery 23 years ago - at that time she had sought medical care but was told it was too late for anything to be done.

## 2024-08-24 NOTE — PHYSICAL EXAM
[No Acute Distress] : no acute distress [Well Nourished] : well nourished [Well Developed] : well developed [Well-Appearing] : well-appearing [Normal Sclera/Conjunctiva] : normal sclera/conjunctiva [EOMI] : extraocular movements intact [Normal Outer Ear/Nose] : the outer ears and nose were normal in appearance [Normal Oropharynx] : the oropharynx was normal [No JVD] : no jugular venous distention [No Lymphadenopathy] : no lymphadenopathy [Supple] : supple [Thyroid Normal, No Nodules] : the thyroid was normal and there were no nodules present [No Respiratory Distress] : no respiratory distress  [No Accessory Muscle Use] : no accessory muscle use [Clear to Auscultation] : lungs were clear to auscultation bilaterally [Normal Rate] : normal rate  [Regular Rhythm] : with a regular rhythm [Normal S1, S2] : normal S1 and S2 [No Varicosities] : no varicosities [Pedal Pulses Present] : the pedal pulses are present [No Edema] : there was no peripheral edema [No Extremity Clubbing/Cyanosis] : no extremity clubbing/cyanosis [Soft] : abdomen soft [Non Tender] : non-tender [Non-distended] : non-distended [No CVA Tenderness] : no CVA  tenderness [No Spinal Tenderness] : no spinal tenderness [No Joint Swelling] : no joint swelling [Grossly Normal Strength/Tone] : grossly normal strength/tone [No Rash] : no rash [Coordination Grossly Intact] : coordination grossly intact [No Focal Deficits] : no focal deficits [Normal Gait] : normal gait [Normal Affect] : the affect was normal [Normal Insight/Judgement] : insight and judgment were intact [de-identified] : Upper extremity b/l strength intact though pain with abduction

## 2024-08-24 NOTE — HEALTH RISK ASSESSMENT
[Poor] : ~his/her~ current health as poor [Fair] :  ~his/her~ mood as fair [Yes] : Yes [Monthly or less (1 pt)] : Monthly or less (1 point) [1 or 2 (0 pts)] : 1 or 2 (0 points) [Never (0 pts)] : Never (0 points) [No] : In the past 12 months have you used drugs other than those required for medical reasons? No [0] : 2) Feeling down, depressed, or hopeless: Not at all (0) [PHQ-2 Negative - No further assessment needed] : PHQ-2 Negative - No further assessment needed [Never] : Never [With Significant Other] : lives with significant other [Employed] : employed [] :  [Feels Safe at Home] : Feels safe at home [Fully functional (bathing, dressing, toileting, transferring, walking, feeding)] : Fully functional (bathing, dressing, toileting, transferring, walking, feeding) [Fully functional (using the telephone, shopping, preparing meals, housekeeping, doing laundry, using] : Fully functional and needs no help or supervision to perform IADLs (using the telephone, shopping, preparing meals, housekeeping, doing laundry, using transportation, managing medications and managing finances) [Audit-CScore] : 1 [de-identified] : Works as  [de-identified] : Not eating much - one meal a day [[id number]] : 0 [Reports changes in hearing] : Reports no changes in hearing [Reports changes in vision] : Reports no changes in vision [Reports changes in dental health] : Reports no changes in dental health [FreeTextEntry2] :

## 2024-08-24 NOTE — HEALTH RISK ASSESSMENT
[Poor] : ~his/her~ current health as poor [Fair] :  ~his/her~ mood as fair [Yes] : Yes [Monthly or less (1 pt)] : Monthly or less (1 point) [1 or 2 (0 pts)] : 1 or 2 (0 points) [Never (0 pts)] : Never (0 points) [No] : In the past 12 months have you used drugs other than those required for medical reasons? No [0] : 2) Feeling down, depressed, or hopeless: Not at all (0) [PHQ-2 Negative - No further assessment needed] : PHQ-2 Negative - No further assessment needed [Never] : Never [With Significant Other] : lives with significant other [Employed] : employed [] :  [Feels Safe at Home] : Feels safe at home [Fully functional (bathing, dressing, toileting, transferring, walking, feeding)] : Fully functional (bathing, dressing, toileting, transferring, walking, feeding) [Fully functional (using the telephone, shopping, preparing meals, housekeeping, doing laundry, using] : Fully functional and needs no help or supervision to perform IADLs (using the telephone, shopping, preparing meals, housekeeping, doing laundry, using transportation, managing medications and managing finances) [Audit-CScore] : 1 [de-identified] : Works as  [de-identified] : Not eating much - one meal a day [[id number]] : 0 [Reports changes in hearing] : Reports no changes in hearing [Reports changes in vision] : Reports no changes in vision [Reports changes in dental health] : Reports no changes in dental health [FreeTextEntry2] :

## 2024-08-24 NOTE — PHYSICAL EXAM
[No Acute Distress] : no acute distress [Well Nourished] : well nourished [Well Developed] : well developed [Well-Appearing] : well-appearing [Normal Sclera/Conjunctiva] : normal sclera/conjunctiva [EOMI] : extraocular movements intact [Normal Outer Ear/Nose] : the outer ears and nose were normal in appearance [Normal Oropharynx] : the oropharynx was normal [No JVD] : no jugular venous distention [No Lymphadenopathy] : no lymphadenopathy [Supple] : supple [Thyroid Normal, No Nodules] : the thyroid was normal and there were no nodules present [No Respiratory Distress] : no respiratory distress  [No Accessory Muscle Use] : no accessory muscle use [Clear to Auscultation] : lungs were clear to auscultation bilaterally [Normal Rate] : normal rate  [Regular Rhythm] : with a regular rhythm [Normal S1, S2] : normal S1 and S2 [No Varicosities] : no varicosities [Pedal Pulses Present] : the pedal pulses are present [No Edema] : there was no peripheral edema [No Extremity Clubbing/Cyanosis] : no extremity clubbing/cyanosis [Soft] : abdomen soft [Non Tender] : non-tender [Non-distended] : non-distended [No CVA Tenderness] : no CVA  tenderness [No Spinal Tenderness] : no spinal tenderness [No Joint Swelling] : no joint swelling [Grossly Normal Strength/Tone] : grossly normal strength/tone [No Rash] : no rash [Coordination Grossly Intact] : coordination grossly intact [No Focal Deficits] : no focal deficits [Normal Gait] : normal gait [Normal Affect] : the affect was normal [Normal Insight/Judgement] : insight and judgment were intact [de-identified] : Upper extremity b/l strength intact though pain with abduction

## 2024-08-24 NOTE — INTERPRETER SERVICES
[Ad Hoc ] : provided by an ad hoc  [Interpreters_Relationshiptopatient] : Spouse [TWNoteComboBox1] : Somali

## 2024-08-24 NOTE — INTERPRETER SERVICES
[Ad Hoc ] : provided by an ad hoc  [Interpreters_Relationshiptopatient] : Spouse [TWNoteComboBox1] : [place] Island[place]

## 2024-09-04 DIAGNOSIS — R73.03 PREDIABETES: ICD-10-CM

## 2024-09-04 DIAGNOSIS — E78.5 HYPERLIPIDEMIA, UNSPECIFIED: ICD-10-CM

## 2024-09-26 ENCOUNTER — APPOINTMENT (OUTPATIENT)
Dept: MAMMOGRAPHY | Facility: IMAGING CENTER | Age: 54
End: 2024-09-26

## 2024-09-26 ENCOUNTER — OUTPATIENT (OUTPATIENT)
Dept: OUTPATIENT SERVICES | Facility: HOSPITAL | Age: 54
LOS: 1 days | End: 2024-09-26
Payer: MEDICAID

## 2024-09-26 ENCOUNTER — APPOINTMENT (OUTPATIENT)
Dept: RADIOLOGY | Facility: IMAGING CENTER | Age: 54
End: 2024-09-26
Payer: MEDICAID

## 2024-09-26 ENCOUNTER — RESULT REVIEW (OUTPATIENT)
Age: 54
End: 2024-09-26

## 2024-09-26 DIAGNOSIS — Z00.8 ENCOUNTER FOR OTHER GENERAL EXAMINATION: ICD-10-CM

## 2024-09-26 DIAGNOSIS — Z98.891 HISTORY OF UTERINE SCAR FROM PREVIOUS SURGERY: Chronic | ICD-10-CM

## 2024-09-26 PROCEDURE — 72040 X-RAY EXAM NECK SPINE 2-3 VW: CPT

## 2024-09-26 PROCEDURE — 77063 BREAST TOMOSYNTHESIS BI: CPT

## 2024-09-26 PROCEDURE — 77067 SCR MAMMO BI INCL CAD: CPT

## 2024-09-26 PROCEDURE — 77067 SCR MAMMO BI INCL CAD: CPT | Mod: 26

## 2024-09-26 PROCEDURE — 72040 X-RAY EXAM NECK SPINE 2-3 VW: CPT | Mod: 26

## 2024-09-26 PROCEDURE — 77063 BREAST TOMOSYNTHESIS BI: CPT | Mod: 26

## 2024-09-27 ENCOUNTER — NON-APPOINTMENT (OUTPATIENT)
Age: 54
End: 2024-09-27

## 2024-09-30 ENCOUNTER — NON-APPOINTMENT (OUTPATIENT)
Age: 54
End: 2024-09-30

## 2024-10-01 ENCOUNTER — OUTPATIENT (OUTPATIENT)
Dept: OUTPATIENT SERVICES | Facility: HOSPITAL | Age: 54
LOS: 1 days | End: 2024-10-01
Payer: MEDICAID

## 2024-10-01 ENCOUNTER — APPOINTMENT (OUTPATIENT)
Dept: OBGYN | Facility: CLINIC | Age: 54
End: 2024-10-01
Payer: MEDICAID

## 2024-10-01 VITALS — BODY MASS INDEX: 20.4 KG/M2 | DIASTOLIC BLOOD PRESSURE: 70 MMHG | WEIGHT: 101 LBS | SYSTOLIC BLOOD PRESSURE: 116 MMHG

## 2024-10-01 DIAGNOSIS — Z00.00 ENCOUNTER FOR GENERAL ADULT MEDICAL EXAMINATION W/OUT ABNORMAL FINDINGS: ICD-10-CM

## 2024-10-01 DIAGNOSIS — N76.0 ACUTE VAGINITIS: ICD-10-CM

## 2024-10-01 DIAGNOSIS — Z98.891 HISTORY OF UTERINE SCAR FROM PREVIOUS SURGERY: Chronic | ICD-10-CM

## 2024-10-01 DIAGNOSIS — Z01.419 ENCOUNTER FOR GYNECOLOGICAL EXAMINATION (GENERAL) (ROUTINE) W/OUT ABNORMAL FINDINGS: ICD-10-CM

## 2024-10-01 PROCEDURE — G0463: CPT

## 2024-10-01 PROCEDURE — 99203 OFFICE O/P NEW LOW 30 MIN: CPT | Mod: GC

## 2024-10-04 NOTE — PLAN
[FreeTextEntry1] : A/P: 55 yo  postmenopausal presents for annual exam. Patient is stable and doing well.  #HCM  - Next pap in   - Next colonoscopy   - Mammogram in 1 year  - RTC in 1 year for annual or sooner prn.   D/w Dr. Hannah Montiel, PGY1

## 2024-10-04 NOTE — HISTORY OF PRESENT ILLNESS
[FreeTextEntry1] : #: [id number]  55 yo  postmenopausal pt presents for annual exam. Denies hot flashes, mood swings, vaginal dryness, VB/VD. Denies CP/SOB. Reports some sexual disinterest, however is not concerned with this at this time.   ObHx:   , uncomplicated vaginal    c/s for "big baby"  GynxHx: denies hx of fibroids, ovarian cysts, abnormal pap smears, or STIs.  Not currently sexualy active.  MedHx: PreDM, (Hba1c 5.8), HLD, allergic rhinitis, carpal tunnel, insomnia  Meds: diclofenac spray for joint pain  SurgHx: gastric bypass, csx1  allgeries: nkda  Social: denies tobacco, drug, or etoh use   HCM Pap 2021 NILM, HPV neg Declines STI screening  Mammogram 2024 BIRADS-1, TC lifetime risk 2.1%, Grade 0 breast arterial calcification  Colonoscopy - , normal per patient report

## 2024-10-04 NOTE — HISTORY OF PRESENT ILLNESS
[FreeTextEntry1] : #: [id number]  53 yo  postmenopausal pt presents for annual exam. Denies hot flashes, mood swings, vaginal dryness, VB/VD. Denies CP/SOB. Reports some sexual disinterest, however is not concerned with this at this time.   ObHx:   , uncomplicated vaginal    c/s for "big baby"  GynxHx: denies hx of fibroids, ovarian cysts, abnormal pap smears, or STIs.  Not currently sexualy active.  MedHx: PreDM, (Hba1c 5.8), HLD, allergic rhinitis, carpal tunnel, insomnia  Meds: diclofenac spray for joint pain  SurgHx: gastric bypass, csx1  allgeries: nkda  Social: denies tobacco, drug, or etoh use   HCM Pap 2021 NILM, HPV neg Declines STI screening  Mammogram 2024 BIRADS-1, TC lifetime risk 2.1%, Grade 0 breast arterial calcification  Colonoscopy - , normal per patient report

## 2024-10-04 NOTE — PHYSICAL EXAM
[Chaperone Present] : A chaperone was present in the examining room during all aspects of the physical examination [[id number]] : A chaperone was present during the pelvic exam. [Appropriately responsive] : appropriately responsive [Alert] : alert [Soft] : soft [Non-tender] : non-tender [Non-distended] : non-distended [Labia Majora] : normal [Labia Minora] : normal [Normal] : normal [Uterine Adnexae] : normal [FreeTextEntry6] : No masses, tenderness, nipple discharge, or axillary LAD bilaterally.

## 2024-10-04 NOTE — PLAN
[FreeTextEntry1] : A/P: 53 yo  postmenopausal presents for annual exam. Patient is stable and doing well.  #HCM  - Next pap in   - Next colonoscopy   - Mammogram in 1 year  - RTC in 1 year for annual or sooner prn.   D/w Dr. Hannah Montiel, PGY1

## 2024-10-04 NOTE — PHYSICAL EXAM
[Chaperone Present] : A chaperone was present in the examining room during all aspects of the physical examination [[id number]] : A chaperone was present during the pelvic exam. [Appropriately responsive] : appropriately responsive [Alert] : alert [Soft] : soft [Non-tender] : non-tender [Non-distended] : non-distended [Labia Majora] : normal [Labia Minora] : normal [Normal] : normal [Uterine Adnexae] : normal [FreeTextEntry6] : No masses, tenderness, nipple discharge, or axillary LAD bilaterally.

## 2024-10-14 DIAGNOSIS — Z09 ENCOUNTER FOR FOLLOW-UP EXAMINATION AFTER COMPLETED TREATMENT FOR CONDITIONS OTHER THAN MALIGNANT NEOPLASM: ICD-10-CM
